# Patient Record
Sex: FEMALE | Race: BLACK OR AFRICAN AMERICAN | NOT HISPANIC OR LATINO | Employment: UNEMPLOYED | URBAN - METROPOLITAN AREA
[De-identification: names, ages, dates, MRNs, and addresses within clinical notes are randomized per-mention and may not be internally consistent; named-entity substitution may affect disease eponyms.]

---

## 2018-03-06 ENCOUNTER — INITIAL CONSULT (OUTPATIENT)
Dept: GYNECOLOGIC ONCOLOGY | Facility: CLINIC | Age: 53
End: 2018-03-06
Payer: MEDICAID

## 2018-03-06 VITALS
WEIGHT: 127.19 LBS | HEIGHT: 59 IN | DIASTOLIC BLOOD PRESSURE: 65 MMHG | SYSTOLIC BLOOD PRESSURE: 98 MMHG | HEART RATE: 64 BPM | BODY MASS INDEX: 25.64 KG/M2

## 2018-03-06 DIAGNOSIS — R19.00 PELVIC MASS: Primary | ICD-10-CM

## 2018-03-06 DIAGNOSIS — D49.59 OVARIAN NEOPLASM: ICD-10-CM

## 2018-03-06 PROCEDURE — 99205 OFFICE O/P NEW HI 60 MIN: CPT | Mod: S$PBB,,, | Performed by: OBSTETRICS & GYNECOLOGY

## 2018-03-06 PROCEDURE — 99213 OFFICE O/P EST LOW 20 MIN: CPT | Mod: PBBFAC | Performed by: OBSTETRICS & GYNECOLOGY

## 2018-03-06 PROCEDURE — 99999 PR PBB SHADOW E&M-EST. PATIENT-LVL III: CPT | Mod: PBBFAC,,, | Performed by: OBSTETRICS & GYNECOLOGY

## 2018-03-06 RX ORDER — IBUPROFEN 200 MG/1
TABLET ORAL
Refills: 0 | COMMUNITY
Start: 2018-02-22 | End: 2018-04-26 | Stop reason: CLARIF

## 2018-03-06 RX ORDER — TRAZODONE HYDROCHLORIDE 150 MG/1
150 TABLET ORAL NIGHTLY PRN
COMMUNITY
Start: 2018-02-22

## 2018-03-06 RX ORDER — LISINOPRIL 20 MG/1
20 TABLET ORAL EVERY MORNING
COMMUNITY
Start: 2018-02-07

## 2018-03-06 RX ORDER — POLYETHYLENE GLYCOL-3350 AND ELECTROLYTES 236; 6.74; 5.86; 2.97; 22.74 G/274.31G; G/274.31G; G/274.31G; G/274.31G; G/274.31G
POWDER, FOR SOLUTION ORAL
COMMUNITY
Start: 2018-02-22 | End: 2018-03-26 | Stop reason: ALTCHOICE

## 2018-03-06 NOTE — PROGRESS NOTES
Subjective:      Patient ID: Michelle Rivers is a 53 y.o. female.    Chief Complaint: Pelvic Mass (Consult )      HPI  Referred from Dr. Norma Saunders in [place] for pelvic mass. P1. Primary complaint is diarrhea. And weight loss.      Imaging reviewed.  CT 18 14.8cm predominately cystic pelvic mass with questionable solid component likely arising from the left ovary. No adenopathy or ascites.   US 18 16.2cm complex cystic pelvic mass, 6.3cm solid mass, unable to delineate uterus and ovaries     Labs reviewed:   normal per notes.   hgb 14.7  Cr 0.75    Medical history significant for HIV/Hep C, HTN, tobacco use.     Prior abdominal surgeries include c/s x 1.     Colonoscopy scheduled for 3/12/18.     MMG per patient normal a couple months ago.     Denies family history of breast, colon, uterine, or ovarian cancer.     Review of Systems   Constitutional: Negative for appetite change, chills, fatigue and fever.   HENT: Negative for mouth sores.    Respiratory: Negative for cough and shortness of breath.    Cardiovascular: Negative for leg swelling.   Gastrointestinal: Positive for diarrhea. Negative for abdominal pain, blood in stool and constipation.   Endocrine: Negative for cold intolerance.   Genitourinary: Negative for dysuria and vaginal bleeding.   Musculoskeletal: Negative for myalgias.   Skin: Negative for rash.   Allergic/Immunologic: Negative.    Neurological: Negative for weakness and numbness.   Hematological: Negative for adenopathy. Does not bruise/bleed easily.   Psychiatric/Behavioral: Negative for confusion.       History reviewed. No pertinent past medical history.  Past Surgical History:   Procedure Laterality Date    BREAST SURGERY       SECTION, CLASSIC       History reviewed. No pertinent family history.  Social History     Social History    Marital status: Single     Spouse name: N/A    Number of children: N/A    Years of education: N/A     Occupational History     Not on file.     Social History Main Topics    Smoking status: Current Every Day Smoker     Packs/day: 2.00    Smokeless tobacco: Not on file    Alcohol use Yes    Drug use: Yes      Comment: crack    Sexual activity: Not on file     Other Topics Concern    Not on file     Social History Narrative    No narrative on file     Current Outpatient Prescriptions   Medication Sig    lisinopril (PRINIVIL,ZESTRIL) 20 MG tablet     traZODone (DESYREL) 150 MG tablet Take 150 mg by mouth nightly as needed.     albuterol 90 mcg/actuation inhaler Inhale 2 puffs into the lungs every 4 (four) hours as needed for Wheezing.    azithromycin (ZITHROMAX Z-TARAS) 250 MG tablet Take 1 tablet (250 mg total) by mouth once daily. Take 2 tabs on day 1 then 1 tab kristen;y from day 2-5    GAVILYTE-G 236-22.74-6.74 -5.86 gram suspension     LAXATIVE, BISACODYL, 5 mg EC tablet take 4 tablets by mouth as directed     No current facility-administered medications for this visit.      Review of patient's allergies indicates:  No Known Allergies    Objective:   Physical Exam:   Constitutional: She is oriented to person, place, and time. She appears well-developed and well-nourished.    HENT:   Head: Normocephalic and atraumatic.    Eyes: EOM are normal. Pupils are equal, round, and reactive to light.    Neck: Normal range of motion. Neck supple. No thyromegaly present.    Cardiovascular: Normal rate, regular rhythm and intact distal pulses.     Pulmonary/Chest: Effort normal and breath sounds normal. No respiratory distress. She has no wheezes.        Abdominal: Soft. Bowel sounds are normal. She exhibits no distension, no ascites and no mass. There is no tenderness.     Genitourinary: Rectum normal, vagina normal and uterus normal. Pelvic exam was performed with patient supine. There is no lesion on the right labia. There is no lesion on the left labia. Cervix is normal. Right adnexum displays mass and fullness. Left adnexum displays  mass and fullness.   Genitourinary Comments: Large firm mass filling the posterior cul de sac, somewhat fixed, deviates the cervix anteriorly           Musculoskeletal: Normal range of motion and moves all extremeties.      Lymphadenopathy:     She has no cervical adenopathy.        Right: No inguinal and no supraclavicular adenopathy present.        Left: No inguinal and no supraclavicular adenopathy present.    Neurological: She is alert and oriented to person, place, and time.    Skin: Skin is warm and dry. No rash noted.    Psychiatric: She has a normal mood and affect.       Assessment:     1. Pelvic mass    2. Ovarian neoplasm        Plan:     Orders Placed This Encounter   Procedures    CT Abdomen Pelvis With Contrast    CEA      I discussed with the patient the differential diagnosis of pelvic mass in a postmenopausal woman including benign, borderline, and malignant process. The mass is firm and fixed in the cul de sac, likely why her primary symptom is diarrhea. She is scheduled for colonoscopy 3/12/18 and I would like her to have this prior to any surgical intervention to rule out colon primary. Will obtain CEA,  was normal. I would also like to repeat imaging to that I may review the images with radiology for surgical planning. She will return in 1 week for follow up and further treatment planning after the above have resulted.      CEA  CT abd and pelvis

## 2018-03-06 NOTE — Clinical Note
March 9, 2018      Olu Cunningham MD  [address] [address]           [facility] - Gynecologic Oncology  [address]  [facility] [address]  Phone: [phone]  Fax: [phone]          Patient: Michelle Rivers   MR Number: [id number]   YOB: 1965   Date of Visit: 3/6/2018       Dear Dr. Olu Cunningham:    Thank you for referring Michelle Rivers to me for evaluation. Attached you will find relevant portions of my assessment and plan of care.    If you have questions, please do not hesitate to call me. I look forward to following Michelle Rivers along with you.    Sincerely,    Gisselle Pedersen  CC:  No Recipients    If you would like to receive this communication electronically, please contact [email or web address] or [phone] to request more information on [employer] access.    For providers and/or their staff who would like to refer a patient to [facility], please contact us through our one-stop-shop provider referral line, Cedrick Walters, at [phone].    If you feel you have received this communication in error or would no longer like to receive these types of communications, please e-mail [email or web address]

## 2018-03-06 NOTE — LETTER
March 20, 2018        Norma Saunders MD  [address]  [address]  [place] [facility] [place] [address]             [facility]  [address]  [facility] [address]  Phone: [phone]  Fax: [phone]   Patient: Michelle Rivers   MR Number: [id number]   YOB: 1965   Date of Visit: 3/6/2018       Dear Dr. Saunders:    Thank you for referring Michelle Rivers to me for evaluation. Attached you will find relevant portions of my assessment and plan of care.    If you have questions, please do not hesitate to call me. I look forward to following Michelle Rivers along with you.    Sincerely,      Kaitlin Pinto MD            CC  No Recipients    Enclosure

## 2018-03-14 ENCOUNTER — TELEPHONE (OUTPATIENT)
Dept: GYNECOLOGIC ONCOLOGY | Facility: CLINIC | Age: 53
End: 2018-03-14

## 2018-03-16 ENCOUNTER — TELEPHONE (OUTPATIENT)
Dept: GYNECOLOGIC ONCOLOGY | Facility: CLINIC | Age: 53
End: 2018-03-16

## 2018-03-19 ENCOUNTER — TELEPHONE (OUTPATIENT)
Dept: GYNECOLOGIC ONCOLOGY | Facility: CLINIC | Age: 53
End: 2018-03-19

## 2018-03-19 ENCOUNTER — HOSPITAL ENCOUNTER (OUTPATIENT)
Dept: RADIOLOGY | Facility: OTHER | Age: 53
Discharge: HOME | End: 2018-03-19
Attending: OBSTETRICS & GYNECOLOGY
Payer: MEDICAID

## 2018-03-19 DIAGNOSIS — R19.00 PELVIC MASS: Primary | ICD-10-CM

## 2018-03-19 PROCEDURE — 74177 CT ABD & PELVIS W/CONTRAST: CPT | Mod: TC

## 2018-03-19 PROCEDURE — 25500020 PHARM REV CODE 255: Performed by: OBSTETRICS & GYNECOLOGY

## 2018-03-19 PROCEDURE — 74177 CT ABD & PELVIS W/CONTRAST: CPT | Mod: 26,,, | Performed by: RADIOLOGY

## 2018-03-19 RX ADMIN — IOHEXOL 75 ML: 350 INJECTION, SOLUTION INTRAVENOUS at 04:03

## 2018-03-19 RX ADMIN — IOHEXOL 25 ML: 350 INJECTION, SOLUTION INTRAVENOUS at 03:03

## 2018-03-20 ENCOUNTER — TELEPHONE (OUTPATIENT)
Dept: GYNECOLOGIC ONCOLOGY | Facility: CLINIC | Age: 53
End: 2018-03-20

## 2018-03-20 NOTE — TELEPHONE ENCOUNTER
----- Message from Margo Alvarez sent at 3/20/2018 10:34 AM CDT -----  Contact: pt's aunt [phone] [place]  _  1st Request  _  2nd Request  _  3rd Request        Who:   pt's aunt [phone] [place]  Why: Requesting a call back in regards to needs appt for ct results. Nothing available. Call pt's aunt  Please return the call at earliest convenience. Thanks!    What Number to Call Back:pt's aunt [phone] tor[place]      When to Expect a call back: (Within 24 hours)

## 2018-03-26 ENCOUNTER — TELEPHONE (OUTPATIENT)
Dept: GYNECOLOGIC ONCOLOGY | Facility: CLINIC | Age: 53
End: 2018-03-26

## 2018-03-26 ENCOUNTER — OFFICE VISIT (OUTPATIENT)
Dept: GYNECOLOGIC ONCOLOGY | Facility: CLINIC | Age: 53
End: 2018-03-26
Payer: MEDICAID

## 2018-03-26 VITALS
HEART RATE: 57 BPM | WEIGHT: 129.88 LBS | DIASTOLIC BLOOD PRESSURE: 87 MMHG | SYSTOLIC BLOOD PRESSURE: 132 MMHG | BODY MASS INDEX: 26.23 KG/M2

## 2018-03-26 DIAGNOSIS — R19.00 PELVIC MASS: Primary | ICD-10-CM

## 2018-03-26 PROCEDURE — 99999 PR PBB SHADOW E&M-EST. PATIENT-LVL III: CPT | Mod: PBBFAC,,, | Performed by: OBSTETRICS & GYNECOLOGY

## 2018-03-26 PROCEDURE — 99213 OFFICE O/P EST LOW 20 MIN: CPT | Mod: PBBFAC | Performed by: OBSTETRICS & GYNECOLOGY

## 2018-03-26 PROCEDURE — 99213 OFFICE O/P EST LOW 20 MIN: CPT | Mod: S$PBB,,, | Performed by: OBSTETRICS & GYNECOLOGY

## 2018-04-02 ENCOUNTER — HOSPITAL ENCOUNTER (EMERGENCY)
Facility: HOSPITAL | Age: 53
Discharge: HOME | End: 2018-04-02
Payer: MEDICAID

## 2018-04-02 ENCOUNTER — TELEPHONE (OUTPATIENT)
Dept: GYNECOLOGIC ONCOLOGY | Facility: CLINIC | Age: 53
End: 2018-04-02

## 2018-04-02 VITALS
DIASTOLIC BLOOD PRESSURE: 89 MMHG | HEART RATE: 80 BPM | WEIGHT: 122.25 LBS | OXYGEN SATURATION: 97 % | HEIGHT: 59 IN | RESPIRATION RATE: 16 BRPM | BODY MASS INDEX: 24.64 KG/M2 | SYSTOLIC BLOOD PRESSURE: 135 MMHG | TEMPERATURE: 99 F

## 2018-04-02 DIAGNOSIS — R31.9 HEMATURIA, UNSPECIFIED TYPE: Primary | ICD-10-CM

## 2018-04-02 DIAGNOSIS — N83.8 OVARIAN MASS, LEFT: ICD-10-CM

## 2018-04-02 LAB
ALBUMIN SERPL BCP-MCNC: 4.1 G/DL
ALP SERPL-CCNC: 75 U/L
ALT SERPL W/O P-5'-P-CCNC: 14 U/L
ANION GAP SERPL CALC-SCNC: 11 MMOL/L
AST SERPL-CCNC: 28 U/L
BACTERIA #/AREA URNS HPF: ABNORMAL /HPF
BASOPHILS # BLD AUTO: 0.02 K/UL
BASOPHILS NFR BLD: 0.5 %
BILIRUB SERPL-MCNC: 0.6 MG/DL
BILIRUB UR QL STRIP: NEGATIVE
BUN SERPL-MCNC: 6 MG/DL
CALCIUM SERPL-MCNC: 10.2 MG/DL
CHLORIDE SERPL-SCNC: 105 MMOL/L
CLARITY UR: CLEAR
CO2 SERPL-SCNC: 20 MMOL/L
COLOR UR: YELLOW
CREAT SERPL-MCNC: 0.8 MG/DL
DIFFERENTIAL METHOD: ABNORMAL
EOSINOPHIL # BLD AUTO: 0.1 K/UL
EOSINOPHIL NFR BLD: 1.9 %
ERYTHROCYTE [DISTWIDTH] IN BLOOD BY AUTOMATED COUNT: 14.6 %
EST. GFR  (AFRICAN AMERICAN): >60 ML/MIN/1.73 M^2
EST. GFR  (NON AFRICAN AMERICAN): >60 ML/MIN/1.73 M^2
GLUCOSE SERPL-MCNC: 87 MG/DL
GLUCOSE UR QL STRIP: NEGATIVE
HCT VFR BLD AUTO: 48.2 %
HGB BLD-MCNC: 16.8 G/DL
HGB UR QL STRIP: ABNORMAL
KETONES UR QL STRIP: NEGATIVE
LEUKOCYTE ESTERASE UR QL STRIP: NEGATIVE
LYMPHOCYTES # BLD AUTO: 2 K/UL
LYMPHOCYTES NFR BLD: 48.2 %
MCH RBC QN AUTO: 31.3 PG
MCHC RBC AUTO-ENTMCNC: 34.9 G/DL
MCV RBC AUTO: 90 FL
MICROSCOPIC COMMENT: ABNORMAL
MONOCYTES # BLD AUTO: 0.3 K/UL
MONOCYTES NFR BLD: 7.3 %
NEUTROPHILS # BLD AUTO: 1.8 K/UL
NEUTROPHILS NFR BLD: 42.1 %
NITRITE UR QL STRIP: NEGATIVE
PH UR STRIP: 5 [PH] (ref 5–8)
PLATELET # BLD AUTO: 207 K/UL
PMV BLD AUTO: 10 FL
POTASSIUM SERPL-SCNC: 4.8 MMOL/L
PROT SERPL-MCNC: 9.6 G/DL
PROT UR QL STRIP: NEGATIVE
RBC # BLD AUTO: 5.36 M/UL
RBC #/AREA URNS HPF: >100 /HPF (ref 0–4)
SODIUM SERPL-SCNC: 136 MMOL/L
SP GR UR STRIP: 1.02 (ref 1–1.03)
SQUAMOUS #/AREA URNS HPF: 40 /HPF
URN SPEC COLLECT METH UR: ABNORMAL
UROBILINOGEN UR STRIP-ACNC: NEGATIVE EU/DL
WBC # BLD AUTO: 4.23 K/UL

## 2018-04-02 PROCEDURE — 99284 EMERGENCY DEPT VISIT MOD MDM: CPT

## 2018-04-02 PROCEDURE — 87086 URINE CULTURE/COLONY COUNT: CPT

## 2018-04-02 PROCEDURE — 80053 COMPREHEN METABOLIC PANEL: CPT

## 2018-04-02 PROCEDURE — 81000 URINALYSIS NONAUTO W/SCOPE: CPT

## 2018-04-02 PROCEDURE — 25500020 PHARM REV CODE 255: Performed by: REGISTERED NURSE

## 2018-04-02 PROCEDURE — 85025 COMPLETE CBC W/AUTO DIFF WBC: CPT

## 2018-04-02 RX ADMIN — IOHEXOL 75 ML: 350 INJECTION, SOLUTION INTRAVENOUS at 03:04

## 2018-04-02 NOTE — TELEPHONE ENCOUNTER
"Spoke with pt aunt Sheryl. Per Sheryl she "states the pt is passing blood, urinary pressure and condition is getting worse". She states she is on her way to do ED to have the pt further evaluated. Sheryl advised Dr. Pinto will be updated. She voiced understanding   "

## 2018-04-02 NOTE — ED PROVIDER NOTES
History      Chief Complaint   Patient presents with    Hematuria     pt c/o blood in urine and difficulty passing urine, pt reports pmhx bladder mass       Review of patient's allergies indicates:  No Known Allergies     HPI   HPI    2018, 12:13 PM   History obtained from the patient      History of Present Illness: Michelle Rivers is a 53 y.o. female patient (Pmhx- ovarian mass) who presents to the Emergency Department for hematuria and urinary hesitancy which onset gradually 2 days ago. Pt has surgery scheduled to have mass removed on 5/3/18 by Dr. Pinto (oncologist at [facility]). Symptoms are constant and moderate in severity. No mitigating or exacerbating factors reported. Associated sxs include pain and diarrhea. Patient denies any fever, CP, SOB, and all other sxs at this time. Prior Tx includes nothing. No further complaints or concerns at this time.          Arrival mode: Personal vehicle      PCP: Serenity Aaron MD       Past Medical History:  No past medical history on file.    Past Surgical History:  Past Surgical History:   Procedure Laterality Date    BREAST SURGERY       SECTION, CLASSIC           Family History:  No family history on file.    Social History:  Social History     Social History Main Topics    Smoking status: Current Every Day Smoker     Packs/day: 2.00    Smokeless tobacco: Never Used    Alcohol use Yes    Drug use: Yes      Comment: crack    Sexual activity: Not on file       ROS   Review of Systems   Constitutional: Negative for fever.   HENT: Negative for sore throat.    Respiratory: Negative for shortness of breath.    Cardiovascular: Negative for chest pain.   Gastrointestinal: Positive for abdominal pain and diarrhea. Negative for nausea.   Genitourinary: Positive for decreased urine volume and hematuria. Negative for dysuria.   Musculoskeletal: Negative for back pain.   Skin: Negative for rash.   Neurological: Negative for weakness.  "  Hematological: Does not bruise/bleed easily.   All other systems reviewed and are negative.      Physical Exam      Initial Vitals [04/02/18 1156]   BP Pulse Resp Temp SpO2   (!) 135/97 85 18 98.6 °F (37 °C) 98 %      MAP       109.67          Physical Exam  Nursing Notes and Vital Signs Reviewed.  Constitutional: Patient is in no acute distress. Well-developed and well-nourished.  Head: Atraumatic. Normocephalic.  Eyes: PERRL. EOM intact. Conjunctivae are not pale. No scleral icterus.  ENT: Mucous membranes are moist. Oropharynx is clear and symmetric.    Neck: Supple. Full ROM. No lymphadenopathy.  Cardiovascular: Regular rate. Regular rhythm. No murmurs, rubs, or gallops. Distal pulses are 2+ and symmetric.  Pulmonary/Chest: No respiratory distress. Clear to auscultation bilaterally. No wheezing or rales.  Abdominal: Soft and non-distended.  There is mild tenderness to LLQ.  No rebound, guarding, or rigidity. Good bowel sounds.  Genitourinary: No CVA tenderness  Musculoskeletal: Moves all extremities. No obvious deformities. No edema. No calf tenderness.  Skin: Warm and dry.  Neurological:  Alert, awake, and appropriate.  Normal speech.  No acute focal neurological deficits are appreciated.  Psychiatric: Normal affect. Good eye contact. Appropriate in content.    ED Course    Procedures  ED Vital Signs:  Vitals:    04/02/18 1156 04/02/18 1354 04/02/18 1544   BP: (!) 135/97 114/85 135/89   Pulse: 85 72 80   Resp: 18 18 16   Temp: 98.6 °F (37 °C)  98.5 °F (36.9 °C)   TempSrc: Oral  Oral   SpO2: 98% 98% 97%   Weight: 55.4 kg (122 lb 3.9 oz)     Height: 4' 11" (1.499 m)         Abnormal Lab Results:  Labs Reviewed   CBC W/ AUTO DIFFERENTIAL - Abnormal; Notable for the following:        Result Value    Hemoglobin 16.8 (*)     MCH 31.3 (*)     RDW 14.6 (*)     Lymph% 48.2 (*)     All other components within normal limits   COMPREHENSIVE METABOLIC PANEL - Abnormal; Notable for the following:     CO2 20 (*)     Total " Protein 9.6 (*)     All other components within normal limits   URINALYSIS - Abnormal; Notable for the following:     Occult Blood UA 3+ (*)     All other components within normal limits   URINALYSIS MICROSCOPIC - Abnormal; Notable for the following:     RBC, UA >100 (*)     Bacteria, UA Moderate (*)     All other components within normal limits   CULTURE, URINE   CULTURE, URINE        All Lab Results:  Results for orders placed or performed during the hospital encounter of 04/02/18   CBC auto differential   Result Value Ref Range    WBC 4.23 3.90 - 12.70 K/uL    RBC 5.36 4.00 - 5.40 M/uL    Hemoglobin 16.8 (H) 12.0 - 16.0 g/dL    Hematocrit 48.2 37.0 - 48.5 %    MCV 90 82 - 98 fL    MCH 31.3 (H) 27.0 - 31.0 pg    MCHC 34.9 32.0 - 36.0 g/dL    RDW 14.6 (H) 11.5 - 14.5 %    Platelets 207 150 - 350 K/uL    MPV 10.0 9.2 - 12.9 fL    Gran # (ANC) 1.8 1.8 - 7.7 K/uL    Lymph # 2.0 1.0 - 4.8 K/uL    Mono # 0.3 0.3 - 1.0 K/uL    Eos # 0.1 0.0 - 0.5 K/uL    Baso # 0.02 0.00 - 0.20 K/uL    Gran% 42.1 38.0 - 73.0 %    Lymph% 48.2 (H) 18.0 - 48.0 %    Mono% 7.3 4.0 - 15.0 %    Eosinophil% 1.9 0.0 - 8.0 %    Basophil% 0.5 0.0 - 1.9 %    Differential Method Automated    Comprehensive metabolic panel   Result Value Ref Range    Sodium 136 136 - 145 mmol/L    Potassium 4.8 3.5 - 5.1 mmol/L    Chloride 105 95 - 110 mmol/L    CO2 20 (L) 23 - 29 mmol/L    Glucose 87 70 - 110 mg/dL    BUN, Bld 6 6 - 20 mg/dL    Creatinine 0.8 0.5 - 1.4 mg/dL    Calcium 10.2 8.7 - 10.5 mg/dL    Total Protein 9.6 (H) 6.0 - 8.4 g/dL    Albumin 4.1 3.5 - 5.2 g/dL    Total Bilirubin 0.6 0.1 - 1.0 mg/dL    Alkaline Phosphatase 75 55 - 135 U/L    AST 28 10 - 40 U/L    ALT 14 10 - 44 U/L    Anion Gap 11 8 - 16 mmol/L    eGFR if African American >60 >60 mL/min/1.73 m^2    eGFR if non African American >60 >60 mL/min/1.73 m^2   Urinalysis Clean Catch   Result Value Ref Range    Specimen UA Urine, Clean Catch     Color, UA Yellow Yellow, Straw, Rachel     Appearance, UA Clear Clear    pH, UA 5.0 5.0 - 8.0    Specific Gravity, UA 1.025 1.005 - 1.030    Protein, UA Negative Negative    Glucose, UA Negative Negative    Ketones, UA Negative Negative    Bilirubin (UA) Negative Negative    Occult Blood UA 3+ (A) Negative    Nitrite, UA Negative Negative    Urobilinogen, UA Negative <2.0 EU/dL    Leukocytes, UA Negative Negative   Urinalysis Microscopic   Result Value Ref Range    RBC, UA >100 (H) 0 - 4 /hpf    Bacteria, UA Moderate (A) None-Occ /hpf    Squam Epithel, UA 40 /hpf    Microscopic Comment SEE COMMENT          Imaging Results:  Imaging Results          CT Abdomen Pelvis With Contrast (Final result)  Result time 04/02/18 15:18:44    Final result by Lonnie Mitchell III, MD (04/02/18 15:18:44)                 Impression:             1.  Large complex pelvic mass again noted similar in overall size and appearance to March.  There is less mass effect on the ureters with no significant residual hydronephrosis.    2.  No other significant findings.  No detrimental change.      All CT scans at this facility use dose modulation, iterative reconstruction, and/or weight based dosing when appropriate to reduce radiation dose to as low as reasonably achievable.        Electronically signed by: LONNIE MITCHELL MD  Date:     04/02/18  Time:    15:18              Narrative:    Exam: CT scan of the abdomen and pelvis with contrast    Technique: Axial CT imaging was performed through the abdomen and pelvis with  75cc  of intravenous contrast. Multiplanar reformats were performed and interpreted.    Clinical History: Neoplasm: ovarian, suspected .   Abdominal pain     Comparison: March    Findings:         The heart size is normal.  Lung bases show no acute abnormality.  No free intraperitoneal air or bowel obstruction.  Bony windows show no acute abnormality.  Degenerative changes are noted in the spine.    Within the abdomen the liver and spleen remain unremarkable.   Mild prominence the common duct again noted.  Gallbladder is grossly unremarkable by CT appearance.  No adrenal mass or enlargement and no gross pancreatic abnormality is seen.  No bowel obstruction.  No ascites within the abdomen.  No abnormality in the right lower quadrant to suggest acute appendicitis.  No evidence of abdominal aortic aneurysm or dissection.    The kidneys show less distention of the collecting systems when compared to the prior study and are now within normal limits.  Minimal residual prominence on the left.  Small cyst again noted.      Complex multicystic pelvic mass again noted similar in overall appearance to the prior study.  This currently measures approximately 17 x 9.7 cm in diameter, grossly unchanged.  This has local mass effect throughout the pelvis.                                      The Emergency Provider reviewed the vital signs and test results, which are outlined above.    ED Discussion     3:36 PM: Reassessed pt at this time.  Pt states her condition has improved at this time. Discussed with pt all pertinent ED information and results. Discussed pt dx and plan of tx. Gave pt all f/u and return to the ED instructions. All questions and concerns were addressed at this time. Pt expresses understanding of information and instructions, and is comfortable with plan to discharge. Pt is stable for discharge.        ED Medication(s):  Medications   omnipaque 350 iohexol 75 mL (75 mLs Intravenous Given 4/2/18 1500)       Discharge Medication List as of 4/2/2018  3:36 PM          Follow-up Information     Primary Doctor No In 3 days.                   Medical Decision Making                   Clinical Impression       ICD-10-CM ICD-9-CM   1. Hematuria, unspecified type R31.9 599.70   2. Ovarian mass, left N83.9 620.9               Fredy Mariee Jr., P  04/02/18 2036

## 2018-04-02 NOTE — TELEPHONE ENCOUNTER
"----- Message from Gloria Barnett sent at 4/2/2018 10:06 AM CDT -----  Contact: Sheryl pt's aunt  _  1st Request  _  2nd Request  _x  3rd Request        Who: Sheryl pt's aunt    Why: She states the patient has been "passing blood" and has urinary pressure since yesterday. She states she is bringing the patient to [facility] on [address] in  and would like a call back to discuss the vist.     What Number to Call Back: [phone]    When to Expect a call back: (Before the end of the day)   -- if call after 3:00 call back will be tomorrow.    "

## 2018-04-04 LAB
BACTERIA UR CULT: NORMAL
BACTERIA UR CULT: NORMAL

## 2018-04-08 RX ORDER — SODIUM CHLORIDE 9 MG/ML
INJECTION, SOLUTION INTRAVENOUS CONTINUOUS
Status: CANCELLED | OUTPATIENT
Start: 2018-04-08

## 2018-04-08 RX ORDER — LIDOCAINE HYDROCHLORIDE 10 MG/ML
1 INJECTION, SOLUTION EPIDURAL; INFILTRATION; INTRACAUDAL; PERINEURAL ONCE
Status: CANCELLED | OUTPATIENT
Start: 2018-04-08 | End: 2018-04-08

## 2018-04-08 NOTE — PROGRESS NOTES
Subjective:      Patient ID: Michelle Rivers is a 53 y.o. female.    Chief Complaint: Follow-up (review ct scans/sign consents)      HPI  Referred from Dr. Norma Saunders in [place] for pelvic mass. P1. Primary complaint is diarrhea. And weight loss.       Imaging reviewed.  CT 2/27/18 14.8cm predominately cystic pelvic mass with questionable solid component likely arising from the left ovary. No adenopathy or ascites.   US 2/28/18 16.2cm complex cystic pelvic mass, 6.3cm solid mass, unable to delineate uterus and ovaries      Labs reviewed:   normal per notes.   hgb 14.7  Cr 0.75     Medical history significant for HIV/Hep C, HTN, tobacco use.      Prior abdominal surgeries include c/s x 1.      Colonoscopy scheduled for 3/12/18.      MMG per patient normal a couple months ago.      Denies family history of breast, colon, uterine, or ovarian cancer.    Presents today for follow up. Colonoscopy 3/2018 normal per patient. CEA 5.5   CT 3/19/18  Impression   Complex heterogeneous pelvic mass consistent with history of ovarian neoplasm.  Mild distention of the bilateral renal collecting systems likely due to mass effect from a pelvic mass.     Review of Systems   Constitutional: Negative for appetite change, chills, fatigue and fever.   HENT: Negative for mouth sores.    Respiratory: Negative for cough and shortness of breath.    Cardiovascular: Negative for leg swelling.   Gastrointestinal: Negative for abdominal pain, blood in stool, constipation and diarrhea.   Endocrine: Negative for cold intolerance.   Genitourinary: Negative for dysuria and vaginal bleeding.   Musculoskeletal: Negative for myalgias.   Skin: Negative for rash.   Allergic/Immunologic: Negative.    Neurological: Negative for weakness and numbness.   Hematological: Negative for adenopathy. Does not bruise/bleed easily.   Psychiatric/Behavioral: Negative for confusion.       Objective:   Physical Exam:   Constitutional: She is oriented to  person, place, and time. She appears well-developed and well-nourished.    HENT:   Head: Normocephalic and atraumatic.    Eyes: EOM are normal. Pupils are equal, round, and reactive to light.    Neck: Normal range of motion. Neck supple. No thyromegaly present.    Cardiovascular: Normal rate, regular rhythm and intact distal pulses.     Pulmonary/Chest: Effort normal and breath sounds normal. No respiratory distress. She has no wheezes.        Abdominal: Soft. Bowel sounds are normal. She exhibits no distension, no ascites and no mass. There is no tenderness.             Musculoskeletal: Normal range of motion and moves all extremeties.      Lymphadenopathy:     She has no cervical adenopathy.        Right: No supraclavicular adenopathy present.        Left: No supraclavicular adenopathy present.    Neurological: She is alert and oriented to person, place, and time.    Skin: Skin is warm and dry. No rash noted.    Psychiatric: She has a normal mood and affect.       Assessment:     1. Pelvic mass        Plan:   No orders of the defined types were placed in this encounter.    I discussed with the patient the differential diagnosis for pelvic mass in a postmenopausal woman including benign, borderline, and malignant process. Colonoscopy normal. I have recommended surgical resection. Will plan for ROMAIN/BSO/possible staging absed on intraoperative findings. She desires to proceed. The risks, benefits, and indications of the procedure were discussed with the patient and her family members if present.  These included bleeding, transfusion, infection, damage to surrounding tissues (bowel, bladder, ureter), wound separation, perioperative cardiac events, VTE, pneumonia, and possible death.  She voiced understanding, all questions were answered and consents were signed.  1. Plan for ROMAIN/BSO/possible staging 5/3/18 [facility]  2. Pre op anesthesia consult

## 2018-04-19 ENCOUNTER — TELEPHONE (OUTPATIENT)
Dept: GYNECOLOGIC ONCOLOGY | Facility: CLINIC | Age: 53
End: 2018-04-19

## 2018-04-19 NOTE — TELEPHONE ENCOUNTER
Called pts aunt Mrs Joiner in reference to her message about making a reservation to the [facility] on 5/2/18. Informed pts aunt that the stay would not be free. She was given the phone number to the [facility]. She said thanks.  MA/LPN

## 2018-04-20 DIAGNOSIS — Z01.818 PREOP TESTING: Primary | ICD-10-CM

## 2018-04-23 ENCOUNTER — ANESTHESIA EVENT (OUTPATIENT)
Dept: SURGERY | Facility: HOSPITAL | Age: 53
DRG: 743 | End: 2018-04-23
Payer: MEDICAID

## 2018-04-23 NOTE — ANESTHESIA PREPROCEDURE EVALUATION
Anesthesia Assessment: Preoperative EQUATION     Planned Procedure: Procedure(s) (LRB):  HYSTERECTOMY-ABDOMINAL-TOTAL (ROMAIN) (N/A)  LREXIRYO-BDCRICCNEBQE-SEXEPSXJC (BSO) (Bilateral)  STAGING (N/A)  Requested Anesthesia Type:General  Surgeon: Kaitlin Pinto MD  Service: OB/GYN  Known or anticipated Date of Surgery:5/3/2018     Optimization:  Anesthesia Preop Clinic Assessment  Indicated-requested by surgeon                Plan:    Testing:  T&S   Pre-anesthesia  visit                                        Visit focus: concerns in complex and/or prolonged anesthesia, requested by surgeon                            Patient  has previously scheduled Medical Appointment: none     Navigation: Tests Scheduled.                        Results will be tracked by Preop Clinic.     Stephanie Barcenas RN                          Electronically signed by Stephanie Barcenas RN at 2018  9:47 AM                                                                                                                   2018  Michelle Rivers is a 53 y.o., female.  Pre-operative evaluation for HYSTERECTOMY-ABDOMINAL-TOTAL (ROMAIN) (N/A), CHEKZVCV-ZSLNABHPFVUP-OZOATKKZU (BSO) (Bilateral), STAGING (N/A)    Chief Complaint: diarrhea, weight loss, pelvic mass    PMH:  HTN on ACEI  Smoker   ETOH - beer  HIV, no antiviral- viral load unknown-used IV drugs in distant past  Hep C-not treated  depression  Past Surgical History:   Procedure Laterality Date    BREAST SURGERY      Left side -      SECTION, CLASSIC      once    FRACTURE SURGERY Left     left arm surgery         Vital Signs Range (Last 24H):  Temp:  [36.6 °C (97.8 °F)]   Pulse:  [55]   Resp:  [18]   BP: (101)/(68)   SpO2:  [95 %]       CBC:     Recent Labs  Lab 18  1308   WBC 4.36   RBC 4.99   HGB 15.2   HCT 47.7      MCV 96   MCH 30.5   MCHC 31.9*       CMP: No results for input(s): NA, K, CL, CO2, BUN, CREATININE, GLU, MG, PHOS, CALCIUM,  ALBUMIN, PROT, ALKPHOS, ALT, AST, BILITOT in the last 720 hours.    INR:    Recent Labs  Lab 18  1308   INR 1.0         Diagnostic Studies:      EKD Echo:  Anesthesia Evaluation    I have reviewed the Patient Summary Reports.     I have reviewed the Nursing Notes.   I have reviewed the Medications.     Review of Systems  Anesthesia Hx:  No problems with previous Anesthesia History of prior surgery of interest to airway management or planning: Previous anesthesia: MAC, General LUE ORIF  with general anesthesia.   colonoscopy, egd 3/2018 with MAC.  Denies Family Hx of Anesthesia complications.    Social:  Smoker 1 ppd x 40 yrs;  Drinks beer approx 2 quarts every other day  Off drugs x 4 yrs   Hematology/Oncology:     Oncology Normal   Hematology Comments: HIV (takes no antiviral meds); dx 20 yrs ago   EENT/Dental:   [place]  Glasses  EGD report- 3/12/2018 - Acanthosis nigricans involving the esophagus , posterior pharynx and vocal cords. Pathology from EGD from 3/12/2018-no mention of Acanthosis Nigricans.       Cardiovascular:  Cardiovascular Normal Hypertension  Denies MI.    Functional Capacity good / => 4 METS, walks to store near home several times daily, climb 1 FOS, denies SOB, CP    Pulmonary:  Pulmonary Normal States had bout of bronchitis and was started on Albuterol inhaler which she stopped using because it caused headaches; denies wheezing, SOB   Renal/:  Renal/ Normal  hematuria   Hepatic/GI:   GERD (prn Nexium but currently not needed) Hepatitis, C    Musculoskeletal:   Limited movement of LUE due to pain and previous fracture with ORIF repair   OB/GYN/PEDS:  Pelvic mass   Neurological:  Neurology Normal  Pain , onset is acute , location of lower abdomen/pelvic area , quality of pressing, sharp , severity is a 6    Endocrine:   Denies Diabetes.    Psych:   depression          Physical Exam  General:  Well nourished    Airway/Jaw/Neck:  Airway Findings: Mouth Opening: Normal Tongue:  Normal  General Airway Assessment: Adult  Mallampati: II  Improves to I with phonation.  Jaw/Neck Findings:     Neck ROM: Normal ROM      Dental:  Dental Findings: Periodontal disease, Severe, loose tooth    Chest/Lungs:  Chest/Lungs Findings: Normal Respiratory Rate, Rhonchi     Heart/Vascular:  Heart Findings: Rate: Normal  Rhythm: Regular Rhythm  Sounds: Normal        Mental Status:  Mental Status Findings:  Cooperative, Alert and Oriented       Pt was seen in POC 4/26/18; findings discussed with Dr Leopold-review of outside record (EGD report) recommended ENT, pulmonary eval which was not done; pt scheduled to see Dr Valdez today. Medical optimization: please see EPIC notes for recommendations of pre-op medical consultant, Dr Valdez, for perioperative medical management. /Bri Chang RN        Anesthesia Plan  Type of Anesthesia, risks & benefits discussed:  Anesthesia Type:  general  Patient's Preference:   Intra-op Monitoring Plan: arterial line  Intra-op Monitoring Plan Comments:   Post Op Pain Control Plan: multimodal analgesia  Post Op Pain Control Plan Comments:   Induction:   IV  Beta Blocker:  Patient is not currently on a Beta-Blocker (No further documentation required).       Informed Consent:    ASA Score: 3     Day of Surgery Review of History & Physical:            Ready For Surgery From Anesthesia Perspective.

## 2018-04-23 NOTE — PRE ADMISSION SCREENING
Anesthesia Assessment: Preoperative EQUATION    Planned Procedure: Procedure(s) (LRB):  HYSTERECTOMY-ABDOMINAL-TOTAL (ROMAIN) (N/A)  TUKXCHXI-LYJOLTBIINYY-QROIXTBUZ (BSO) (Bilateral)  STAGING (N/A)  Requested Anesthesia Type:General  Surgeon: Kaitlin Pinto MD  Service: OB/GYN  Known or anticipated Date of Surgery:5/3/2018    Optimization:  Anesthesia Preop Clinic Assessment  Indicated-requested by surgeon      Plan:    Testing:  T&S   Pre-anesthesia  visit  4/26     Visit focus: concerns in complex and/or prolonged anesthesia, requested by surgeon      Patient  has previously scheduled Medical Appointment: none    Navigation: Tests Scheduled. 4/26            Results will be tracked by Preop Clinic.    Stephanie Barcenas RN

## 2018-04-26 ENCOUNTER — INITIAL CONSULT (OUTPATIENT)
Dept: INTERNAL MEDICINE | Facility: CLINIC | Age: 53
End: 2018-04-26
Payer: MEDICAID

## 2018-04-26 ENCOUNTER — TELEPHONE (OUTPATIENT)
Dept: GYNECOLOGIC ONCOLOGY | Facility: CLINIC | Age: 53
End: 2018-04-26

## 2018-04-26 ENCOUNTER — HOSPITAL ENCOUNTER (OUTPATIENT)
Dept: CARDIOLOGY | Facility: CLINIC | Age: 53
Discharge: HOME | End: 2018-04-26
Payer: MEDICAID

## 2018-04-26 ENCOUNTER — HOSPITAL ENCOUNTER (OUTPATIENT)
Dept: PREADMISSION TESTING | Facility: HOSPITAL | Age: 53
Discharge: HOME | End: 2018-04-26
Attending: ANESTHESIOLOGY
Payer: MEDICAID

## 2018-04-26 VITALS
RESPIRATION RATE: 18 BRPM | WEIGHT: 126.69 LBS | HEIGHT: 59 IN | BODY MASS INDEX: 25.54 KG/M2 | SYSTOLIC BLOOD PRESSURE: 124 MMHG | OXYGEN SATURATION: 99 % | HEART RATE: 68 BPM | TEMPERATURE: 98 F | DIASTOLIC BLOOD PRESSURE: 80 MMHG

## 2018-04-26 DIAGNOSIS — D58.2 ELEVATED HEMOGLOBIN: ICD-10-CM

## 2018-04-26 DIAGNOSIS — F32.A DEPRESSION, UNSPECIFIED DEPRESSION TYPE: ICD-10-CM

## 2018-04-26 DIAGNOSIS — R79.89 ABNORMAL CBC: ICD-10-CM

## 2018-04-26 DIAGNOSIS — Z72.0 TOBACCO ABUSE: ICD-10-CM

## 2018-04-26 DIAGNOSIS — B19.20 HEPATITIS C VIRUS INFECTION WITHOUT HEPATIC COMA, UNSPECIFIED CHRONICITY: ICD-10-CM

## 2018-04-26 DIAGNOSIS — Z01.818 PREOP TESTING: ICD-10-CM

## 2018-04-26 DIAGNOSIS — B20 HIV (HUMAN IMMUNODEFICIENCY VIRUS INFECTION): ICD-10-CM

## 2018-04-26 DIAGNOSIS — R39.9 LOWER URINARY TRACT SYMPTOMS (LUTS): ICD-10-CM

## 2018-04-26 DIAGNOSIS — R19.00 PELVIC MASS: ICD-10-CM

## 2018-04-26 DIAGNOSIS — L83 ACANTHOSIS NIGRICANS: ICD-10-CM

## 2018-04-26 DIAGNOSIS — I10 ESSENTIAL HYPERTENSION: ICD-10-CM

## 2018-04-26 DIAGNOSIS — K21.9 GASTROESOPHAGEAL REFLUX DISEASE, ESOPHAGITIS PRESENCE NOT SPECIFIED: ICD-10-CM

## 2018-04-26 DIAGNOSIS — Z01.818 PREOP TESTING: Primary | ICD-10-CM

## 2018-04-26 DIAGNOSIS — R77.8 ELEVATED TOTAL PROTEIN: ICD-10-CM

## 2018-04-26 PROCEDURE — 99212 OFFICE O/P EST SF 10 MIN: CPT | Mod: PBBFAC,25 | Performed by: HOSPITALIST

## 2018-04-26 PROCEDURE — 99999 PR PBB SHADOW E&M-EST. PATIENT-LVL II: CPT | Mod: PBBFAC,,, | Performed by: HOSPITALIST

## 2018-04-26 PROCEDURE — 99204 OFFICE O/P NEW MOD 45 MIN: CPT | Mod: S$PBB,,, | Performed by: HOSPITALIST

## 2018-04-26 NOTE — LETTER
April 26, 2018      Kaitlin Pinto MD  [address] [facility] [id number]           [facility] - Pre Op Consult  [address] [facility] [address]  Phone: [phone]          Patient: Michelle Rivers   MR Number: [id number]   YOB: 1965   Date of Visit: 4/26/2018       Dear Dr. Kaitlin Pinto:    Thank you for referring Michelle Rivers to me for evaluation. Attached you will find relevant portions of my assessment and plan of care.    If you have questions, please do not hesitate to call me. I look forward to following Michelle Rivers along with you.    Sincerely,    Heather Valdez MD    Enclosure  CC:  No Recipients    If you would like to receive this communication electronically, please contact [email or web address] or [phone] to request more information on [employer] access.    For providers and/or their staff who would like to refer a patient to [facility], please contact us through our one-stop-shop provider referral line, [facility], at [phone].    If you feel you have received this communication in error or would no longer like to receive these types of communications, please e-mail [email or web address]

## 2018-04-26 NOTE — HPI
History of present illness- I had the pleasure of meeting this pleasant 53 y.o. lady in the pre op clinic prior to her elective Gynecological surgery. The patient is new to me ..    I have obtained the history by speaking to the patient and by reviewing the electronic health records.    Events leading up to surgery / History of presenting illness -    Pelvic mass    She has been troubled with moderate-severe  sharp lower abdominal   Pain on and off  for about 2 months  Pain some times comes randomly and some times , when she holds the urine      Relevant health conditions of significance for the perioperative period/ History of presenting illness -    Patient Active Problem List    Diagnosis Date Noted    Essential hypertension 04/26/2018     Lisinopril   For about a few months  No Home  Machine  Did not take BP medication this AM  To her understanding BP is controlled with Primary care MD      Tobacco abuse 04/26/2018     Tobacco use- Smokes Cigarettes- 1 PPD-since age 13 years   Not known to have COPD , no chronic Phlegm, no wheezing   She used to have Asthma/ Bronchitis many years ago and had a bad cold and came to ER ,about 3 years ago , did not require hospitalization   Had an Injection ( ? Steroid) and was send home on Cough Medicine and inhaler   Tried Inhaler , but she is no longer using it as she had head ache with inhaler   No longer experiencing lung problems since them      HIV (human immunodeficiency virus infection) 04/26/2018     History of Intra venous drug abuse about 20 years ago   Stopped IVDU 20 years ago  Smoked crack cocaine until 4 years ago   Diagnosed about 20 years ago   Follows with infectious disease at [facility] , Last seen about 3 months ago   Not on HIV treatment   Never was on treatment   To her understanding she did not need HIV treatment   No history of Tuberculosis , Thrush or Proneness to Infection   Un aware of CD4 count         Hepatitis C 04/26/2018     Diagnosed many  years ago   Following ID  As per patient , plan is for commencement of treatment after Gynecological surgery   Not known to have Cirrhosis of liver ?  Not known to have liver failure       Acid reflux 04/26/2018     With certain foods   Controlled  Not needing Medication       Depression 04/26/2018     Trazodone helping sleeping  Under Primary care   Plans on following with  , Psychiatrist after Gynecological surgery       Lower urinary tract symptoms (LUTS) 04/26/2018     Like;y from pelvic mass      Elevated hemoglobin- 4/2/2018 04/26/2018     In the setting of diarrhea   Likely from Hemoconcentration       Elevated total protein 04/26/2018     Likely from Infective illness   Not known to have Myeloma       Acanthosis nigricans 04/26/2018     EGD- 3/12/2018 - Acanthosis nigricans involving the esophagus , posterior pharynx and vocal cords       Pelvic mass 03/06/2018     Started with diarrhea, 3-4 months ago , had it for 2 months   Watery diarrhea , lost about 30 pounds since the beginning of the year   Had it bad and had to use Diaper at night time   On coughing used to have diarrhea   Had diarrhea evaluated and was found to have pelvic mass   Also had mild vaginal bleeding ( noticed on wiping ) on and off for 1-2 months  No longer troubled with diarrhea, appetite and weight picking   Had problems urinating , Hesitancy and feeling on incomplete bladder emptying   Primary complaint is diarrhea. And weight loss.       Imaging  CT 2/27/18 14.8cm predominately cystic pelvic mass with questionable solid component likely arising from the left ovary. No adenopathy or ascites.   US 2/28/18 16.2cm complex cystic pelvic mass, 6.3cm solid mass, unable to delineate uterus and ovaries

## 2018-04-26 NOTE — ASSESSMENT & PLAN NOTE
Prefer that her CD4 count be over 200 for surgery , but would not delay the surgery for CD4 count , given the pelvic mass   Suggested follow up    4/27/2018- chart  Update-  ID records dated 12/21/2017 - CD4 832

## 2018-04-26 NOTE — ASSESSMENT & PLAN NOTE
CT from April 2018 showed   liver and spleen  unremarkable.    No ascites within the abdomen.  No suggestion of liver / hepatic decompensation or portal Hypertension

## 2018-04-26 NOTE — ASSESSMENT & PLAN NOTE
I suggested to consider stopping  smoking tobacco for its benefits in the renata operative period and in the long term  I  Informed about risk of wound healing problem ,infection,lung complications,thrombosis with tobacco use   Referred to tobacco cessation services

## 2018-04-26 NOTE — DISCHARGE INSTRUCTIONS
Your surgery has been scheduled for:__________________________________________    You should report to:  ____[facility], located on the [place] side of the first floor of the           [facility] ([phone])  ____[facility], located on the [facility] side of the            Second floor of the [facility] ([phone])  ____3rd Floor [facility] located on the [facility] side of the [facility] [phone]  Please Note   - Tell your doctor if you take Aspirin, products containing Aspirin, herbal medications  or blood thinners, such as Coumadin, Ticlid, or Plavix.  (Consult your provider regarding holding or stopping before surgery).  - Arrange for someone to drive you home following surgery.  You will not be allowed to leave the surgical facility alone or drive yourself home following sedation and anesthesia.  Before Surgery  - Stop taking all herbal medications 14days prior to surgery  - No Motrin/Advil (Ibuprofen) 7 days before surgery  - No Aleve (Naproxen) 7 days before surgery  - Stop Taking Asprin, products containing Asprin _____days before surgery  - Stop taking blood thinners_______days before surgery  - Refrain from drinking alcoholic beverages for 24hours before and after surgery  - Stop or limit smoking _________days before surgery  Night before Surgery  - DO NOT EAT OR DRINK ANYTHING AFTER MIDNIGHT, INCLUDING GUM, HARD CANDY, MINTS, OR CHEWING TOBACCO.  - Take a shower or bath (shower is recommended).  Bathe with Hibiclens soap or an antibacterial soap from the neck down.  If not supplied by your surgeon, hibiclens soap will need to be purchased over the counter in pharmacy.  Rinse soap off thoroughly.  - Shampoo your hair with your regular shampoo  The Day of Surgery  - Take another bath or shower with hibiclens or any antibacterial soap, to reduce the chance of infection.  - Take heart and blood  pressure medications with a small sip of water, as advised by the perioperative team.  - Do not take fluid pills  - You may brush your teeth and rinse your mouth, but do not swall any additional water.   - Do not apply perfumes, powder, body lotions or deodorant on the day of surgery.  - Nail polish should be removed.  - Do not wear makeup or moisturizer  - Wear comfortable clothes, such as a button front shirt and loose fitting pants.  - Leave all jewelry, including body piercings, and valuables at home.    - Bring any devices you will neeed after surgery such as crutches or canes.  - If you have sleep apnea, please bring your CPAP machine  In the event that your physical condition changes including the onset of a cold or respiratory illness, or if you have to delay or cancel your surgery, please notify your surgeon.Anesthesia: General Anesthesia  Youre due to have surgery. During surgery, youll be given medication called anesthesia. (It is also called anesthetic.) This will keep you comfortable and pain-free. Your anesthesia provider will use general anesthesia. This sheet tells you more about it.  What is general anesthesia?     You are watched continuously during your procedure by the anesthesia provider   General anesthesia puts you into a state like deep sleep. It goes into the bloodstream (IV anesthetics), into the lungs (gas anesthetics), or both. You feel nothing during the procedure. You will not remember it. During the procedure, the anesthesia provider monitors you continuously. He or she checks your heart rate and rhythm, blood pressure, breathing, and blood oxygen.  · IV Anesthetics. IV anesthetics are given through an IV line in your arm. Theyre often given first. This is so you are asleep before a gas anesthetic is started. Some kinds of IV anesthetics relieve pain. Others relax you. Your doctor will decide which kind is best in your case.  · Gas Anesthetics. Gas anesthetics are breathed into the  lungs. They are often used to keep you asleep. They can be given through a facemask or a tube placed in your larynx or trachea (breathing tube).  ? If you have a facemask, your anesthesia provider will most likely place it over your nose and mouth while youre still awake. Youll breathe oxygen through the mask as your IV anesthetic is started. Gas anesthetic may be added through the mask.  ? If you have a tube in the larynx or trachea, it will be inserted into your throat after youre asleep.  Anesthesia tools and medications  You will likely have:  · IV anesthetics. These are put into an IV line into your bloodstream.  · Gas anesthetics. You breathe these anesthetics into your lungs, where they pass into your bloodstream.  · Pulse oximeter. This is a small clip that is attached to the end of your finger. This measures your blood oxygen level.  · Electrocardiography leads (electrodes). These are small sticky pads that are placed on your chest. They record your heart rate and rhythm.  · Blood pressure cuff. This reads your blood pressure.  Risks and possible complications  General anesthesia has some risks. These include:  · Breathing problems  · Nausea and vomiting  · Sore throat or hoarseness (usually temporary)  · Allergic reaction to the anesthetic  · Irregular heartbeat (rare)  · Cardiac arrest (rare)   Anesthesia safety  · Follow all instructions you are given for how long not to eat or drink before your procedure.  · Be sure your doctor knows what medications and drugs you take. This includes over-the-counter medications, herbs, supplements, alcohol or other drugs. You will be asked when those were last taken.  · Have an adult family member or friend drive you home after the procedure.  · For the first 24 hours after your surgery:  ? Do not drive or use heavy equipment.  ? Have a trusted family member or spouse make important decisions or sign documents.  ? Avoid alcohol.  ? Have a responsible adult stay with  you. He or she can watch for problems and help keep you safe.  Date Last Reviewed: 10/16/2014  © 2694-0140 [employer] [address] [place] [address], [place] [address]. All rights reserved. This information is not intended as a substitute for professional medical care. Always follow your healthcare professional's instructions.

## 2018-04-26 NOTE — TELEPHONE ENCOUNTER
Spoke with pt. Pt did not get her EKG today due to transportation leaving. Pt wants to do her EKG in [place]. She will need another order placed in epic. Pt advised message will be forward to Dr. Pinto. She voiced understanding

## 2018-04-26 NOTE — OUTPATIENT SUBJECTIVE & OBJECTIVE
Outpatient Subjective & Objective     Chief complaint-Preoperative evaluation, Perioperative Medical management, complication reduction plan     Active cardiac conditions- none    Revised cardiac risk index predictors- high-risk type of surgery    Functional capacity -Examples of physical activity, uses transportation for getting to MD's and walks to the stores,helpes her 72 year old aunty can take 1 flight of stairs----- She can undertake all the above activities without  chest pain,chest tightness, Shortness of breath ,dizziness,lightheadedness making her exercise tolerance more  than 4 Mets.       Review of Systems   Constitutional: Negative for chills and fever.        As noted   HENT:        STOPBANG score 2 / 8    Elevated BP  Age over 50        Eyes:        Wears glasses   Cannot wear them as the arm of the glasses broke   Respiratory:        Dry Cough on occasions    No Hemoptysis   Cardiovascular:        As noted   Gastrointestinal:        No overt GI/ blood losses  Bowel movements- Regular    Endocrine:        Prednisone use > 20 mg daily for 3 weeks- None   Genitourinary: Negative for dysuria.        Urinary hesitancy    Musculoskeletal:          No unusual, muscle, joint pains   Skin: Negative for rash.   Neurological: Negative for syncope.        No unilateral weakness   Hematological:        Current use of Anticoagulants  Current use of Antiplatelet agents  None   Psychiatric/Behavioral:          No SI/HI     No vascular stenting   No past medical history pertinent negatives.  No family history on file.  Past Surgical History:   Procedure Laterality Date    BREAST SURGERY       SECTION, CLASSIC         No anesthesia, bleeding, cardiac problems , PONVwith previous surgeries/procedures.  Medications and Allergies reviewed in epic  FH- No anesthesia, thrombosis/ Bleeding  , early onset heart disease in family .   Stays with aunty who is going to help post op    Physical Exam   HENT:   Head:  Normocephalic.     Physical Exam   HENT:   Head: Normocephalic.     Constitutional- Vitals - There is no height or weight on file to calculate BMI., There were no vitals filed for this visit.  General appearance-Conscious,Coherent  Eyes- No conjunctival icterus,pupils  round  and  Bilateral cataracts  ENT-Oral cavity- moist  , Hearing grossly normal   Neck- No thyromegaly ,Trachea -central, No jugular venous distension,   No Carotid Bruit   Cardiovascular -Heart Sounds- Normal  and  no murmur   , No gallop rhythm   Respiratory - Normal Respiratory Effort, Normal breath sounds,  no wheeze  and  no forced expiratory wheeze    Peripheral pitting pedal edema-- none , no calf pain   Gastrointestinal -Soft abdomen, Lower abdomen- Rt side  palpable masses,  Tenderness on mass ,Liver,Spleen not palpable.No acute abdomen  No-- free fluid and shifting dullness  Musculoskeletal- No finger Clubbing. Strength grossly normal   Lymphatic-No Palpable cervical, axillary,Inguinal lymphadenopathy   Psychiatric - normal effect,Orientation  Rt Dorsalis pedis pulses-palpable    Lt Dorsalis pedis pulses- palpable   Rt Posterior tibial pulses -palpable   Left posterior tibial pulses -palpable   Miscellaneous -  no asterixis,  no renal bruit and  bowel sounds positive    No thrush   There were no vitals taken for this visit.      Investigations  Lab and Imaging have been reviewed in epic.    Review of Medicine tests    EKG-7/16/2012 personally reviewed reportedly showed    Normal sinus rhythm  Normal ECG  No previous ECGs available    CXR March 2016     No acute chest disease    Review of clinical lab tests:  Lab Results   Component Value Date    CREATININE 0.8 04/02/2018    HGB 16.8 (H) 04/02/2018     04/02/2018     Acidosis from 4/2/2018 likely from Diarrhea at that time    EGD- 3/12/2018 - Acanthosis nigricans involving the esophagus , posterior pharynx and vocal cords   Review of old records- Was done and information gathered  regards to events leading to surgery and health conditions of significance in the perioperative period.    Outpatient Subjective & Objective

## 2018-04-26 NOTE — PROGRESS NOTES
Gennaro Burrows - Pre Op Consult  Progress Note    Patient Name: Michelle Rivers  MRN: [id number]  Date of Evaluation- 04/26/2018  PCP- Serenity Aaron MD    Future cases for Michelle Rivers [[id number]]     Case ID Status Date Time David Procedure Provider Location    [id number] [facility] 5/3/2018  7:00  HYSTERECTOMY-ABDOMINAL-TOTAL (ROMAIN) Kaitlin Pinto MD [[id number]] [facility] OR 2ND FLR          HPI:  History of present illness- I had the pleasure of meeting this pleasant 53 y.o. lady in the pre op clinic prior to her elective Gynecological surgery. The patient is new to me ..    I have obtained the history by speaking to the patient and by reviewing the electronic health records.    Events leading up to surgery / History of presenting illness -    Pelvic mass    She has been troubled with moderate-severe  sharp lower abdominal   Pain on and off  for about 2 months  Pain some times comes randomly and some times , when she holds the urine      Relevant health conditions of significance for the perioperative period/ History of presenting illness -    Patient Active Problem List    Diagnosis Date Noted    Essential hypertension 04/26/2018     Lisinopril   For about a few months  No Home  Machine  Did not take BP medication this AM  To her understanding BP is controlled with Primary care MD      Tobacco abuse 04/26/2018     Tobacco use- Smokes Cigarettes- 1 PPD-since age 13 years   Not known to have COPD , no chronic Phlegm, no wheezing   She used to have Asthma/ Bronchitis many years ago and had a bad cold and came to ER ,about 3 years ago , did not require hospitalization   Had an Injection ( ? Steroid) and was send home on Cough Medicine and inhaler   Tried Inhaler , but she is no longer using it as she had head ache with inhaler   No longer experiencing lung problems since them      HIV (human immunodeficiency virus infection) 04/26/2018     History of Intra venous drug abuse about 20 years ago   Stopped IVDU 20 years ago  Smoked  crack cocaine until 4 years ago   Diagnosed about 20 years ago   Follows with infectious disease at [place] , Last seen about 3 months ago   Not on HIV treatment   Never was on treatment   To her understanding she did not need HIV treatment   No history of Tuberculosis , Thrush or Proneness to Infection   Un aware of CD4 count         Hepatitis C 04/26/2018     Diagnosed many years ago   Following ID  As per patient , plan is for commencement of treatment after Gynecological surgery   Not known to have Cirrhosis of liver ?  Not known to have liver failure       Acid reflux 04/26/2018     With certain foods   Controlled  Not needing Medication       Depression 04/26/2018     Trazodone helping sleeping  Under Primary care   Plans on following with  , Psychiatrist after Gynecological surgery       Lower urinary tract symptoms (LUTS) 04/26/2018     Like;y from pelvic mass      Elevated hemoglobin- 4/2/2018 04/26/2018     In the setting of diarrhea   Likely from Hemoconcentration       Elevated total protein 04/26/2018     Likely from Infective illness   Not known to have Myeloma       Acanthosis nigricans 04/26/2018     EGD- 3/12/2018 - Acanthosis nigricans involving the esophagus , posterior pharynx and vocal cords       Pelvic mass 03/06/2018     Started with diarrhea, 3-4 months ago , had it for 2 months   Watery diarrhea , lost about 30 pounds since the beginning of the year   Had it bad and had to use Diaper at night time   On coughing used to have diarrhea   Had diarrhea evaluated and was found to have pelvic mass   Also had mild vaginal bleeding ( noticed on wiping ) on and off for 1-2 months  No longer troubled with diarrhea, appetite and weight picking   Had problems urinating , Hesitancy and feeling on incomplete bladder emptying   Primary complaint is diarrhea. And weight loss.       Imaging  CT 2/27/18 14.8cm predominately cystic pelvic mass with questionable solid component likely  arising from the left ovary. No adenopathy or ascites.   US 18 16.2cm complex cystic pelvic mass, 6.3cm solid mass, unable to delineate uterus and ovaries            Subjective/ Objective:          Chief complaint-Preoperative evaluation, Perioperative Medical management, complication reduction plan     Active cardiac conditions- none    Revised cardiac risk index predictors- high-risk type of surgery    Functional capacity -Examples of physical activity, uses transportation for getting to MD's and walks to the stores,helpes her 72 year old aunty can take 1 flight of stairs----- She can undertake all the above activities without  chest pain,chest tightness, Shortness of breath ,dizziness,lightheadedness making her exercise tolerance more  than 4 Mets.       Review of Systems   Constitutional: Negative for chills and fever.        As noted   HENT:        STOPBANG score 2 / 8    Elevated BP  Age over 50        Eyes:        Wears glasses   Cannot wear them as the arm of the glasses broke   Respiratory:        Dry Cough on occasions    No Hemoptysis   Cardiovascular:        As noted   Gastrointestinal:        No overt GI/ blood losses  Bowel movements- Regular    Endocrine:        Prednisone use > 20 mg daily for 3 weeks- None   Genitourinary: Negative for dysuria.        Urinary hesitancy    Musculoskeletal:          No unusual, muscle, joint pains   Skin: Negative for rash.   Neurological: Negative for syncope.        No unilateral weakness   Hematological:        Current use of Anticoagulants  Current use of Antiplatelet agents  None   Psychiatric/Behavioral:          No SI/HI     No vascular stenting   No past medical history pertinent negatives.  No family history on file.  Past Surgical History:   Procedure Laterality Date    BREAST SURGERY       SECTION, CLASSIC         No anesthesia, bleeding, cardiac problems , PONVwith previous surgeries/procedures.  Medications and Allergies reviewed in  epic  FH- No anesthesia, thrombosis/ Bleeding  , early onset heart disease in family .   Stays with aunty who is going to help post op    Physical Exam   HENT:   Head: Normocephalic.     Physical Exam   HENT:   Head: Normocephalic.     Constitutional- Vitals - There is no height or weight on file to calculate BMI., There were no vitals filed for this visit.  General appearance-Conscious,Coherent  Eyes- No conjunctival icterus,pupils  round  and  Bilateral cataracts  ENT-Oral cavity- moist  , Hearing grossly normal   Neck- No thyromegaly ,Trachea -central, No jugular venous distension,   No Carotid Bruit   Cardiovascular -Heart Sounds- Normal  and  no murmur   , No gallop rhythm   Respiratory - Normal Respiratory Effort, Normal breath sounds,  no wheeze  and  no forced expiratory wheeze    Peripheral pitting pedal edema-- none , no calf pain   Gastrointestinal -Soft abdomen, Lower abdomen- Rt side  palpable masses,  Tenderness on mass ,Liver,Spleen not palpable.No acute abdomen  No-- free fluid and shifting dullness  Musculoskeletal- No finger Clubbing. Strength grossly normal   Lymphatic-No Palpable cervical, axillary,Inguinal lymphadenopathy   Psychiatric - normal effect,Orientation  Rt Dorsalis pedis pulses-palpable    Lt Dorsalis pedis pulses- palpable   Rt Posterior tibial pulses -palpable   Left posterior tibial pulses -palpable   Miscellaneous -  no asterixis,  no renal bruit and  bowel sounds positive    No thrush   There were no vitals taken for this visit.      Investigations  Lab and Imaging have been reviewed in [facility].    Review of Medicine tests    EKG-7/16/2012 personally reviewed reportedly showed    Normal sinus rhythm  Normal ECG  No previous ECGs available    CXR March 2016     No acute chest disease    Review of clinical lab tests:  Lab Results   Component Value Date    CREATININE 0.8 04/02/2018    HGB 16.8 (H) 04/02/2018     04/02/2018     Acidosis from 4/2/2018 likely from Diarrhea at that  time    EGD- 3/12/2018 - Acanthosis nigricans involving the esophagus , posterior pharynx and vocal cords   Review of old records- Was done and information gathered regards to events leading to surgery and health conditions of significance in the perioperative period.        Preoperative cardiac risk assessment-  The patient does not have any active cardiac conditions . Revised cardiac risk index predictors- 1--.Functional capacity is more than 4 Mets. She will be undergoing a Gynecological procedure that carries a high risk     The estimated risk of the rate of adverse cardiac outcomes  0.9%    No further cardiac work up is indicated prior to proceeding with the surgery     Orders Placed This Encounter    CBC auto differential    Hemoglobin A1c    Ambulatory referral to Smoking Cessation Program       American Society of Anesthesiologists Physical status classification ( ASA ) class: 3     Postoperative pulmonary complication risk assessment:      ARISCAT ( Canet) risk index- risk class -  Low, if duration of surgery is under 3 hours, intermediate, if duration of surgery is over 3 hours      Gregory Respiratory failure index- percentage risk of respiratory failure: 0.5 %    Assessment/Plan:     Essential hypertension  Hypertension-  Blood pressure is acceptable .  I suggest holding -Lisinopril- on the morning of the surgery and can continue that  post operatively under blood pressure, electrolyte and renal function monitoring as long as they are acceptable.I suggest addressing pain control as uncontrolled pain can increased blood pressure     Tobacco abuse    I suggested to consider stopping  smoking tobacco for its benefits in the renata operative period and in the long term  I  Informed about risk of wound healing problem ,infection,lung complications,thrombosis with tobacco use   Referred to tobacco cessation services     HIV (human immunodeficiency virus infection)  Will obtain ID records   Prefer that her  CD4 count be over 200 for surgery , but would not delay the surgery for CD4 count , given the pelvic mass   Suggested follow up    Hepatitis C  CT from April 2018 showed   liver and spleen  unremarkable.    No ascites within the abdomen.  No suggestion of liver / hepatic decompensation or portal Hypertension    Acid reflux   I suggest aspiration precautions    Pelvic mass  For surgery     Elevated hemoglobin- 4/2/2018   Will re check     Elevated total protein  Suggested follow up     Acanthosis nigricans  Will check A1c         Preventive perioperative care    Thromboembolic prophylaxis:  Her risk factors for thrombosis include tobacco use, surgical procedure and age.I suggest  thromboembolic prophylaxis ( mechanical/pharmacological, weighing the risk benefits of pharmacological agent use considering renata procedural bleeding )  during the perioperative period.I suggested being active in the post operative period.      Postoperative pulmonary complication prophylaxis-Risk factors for post operative pulmonary complications include ASA class >2, tobacco use and proximity of the surgical site to the lungs- I suggest incentive spirometry use, early ambulation and pain control so as to avoid diaphragmatic splinting  , oral care , head end of bed elevation     Renal complication prophylaxis-Risk factors for renal complications include hypertension . I suggest keeping her well hydrated.I suggested drinking 2 litre's of water a day      Surgical site Infection Prophylaxis-I  suggest appropriate antibiotic for Prophylaxis against Surgical site infections      In view of gynecological procedure the patient  is at risk of postoperative urinary retention.  I suggest avoidance / minimizing the of  Benzodiazepines,Anticholinergic medication,antihistamines ( Benadryl) , if possible in the perioperative period. I suggest using the minimum possible use of opioids for the minimum period of time in the perioperative period. Benadryl  avoidance suggested      This visit was focused on Preoperative evaluation, Perioperative Medical management, complication reduction plans. I suggest that the patient follows up with primary care or relevant sub specialists for ongoing health care.    I appreciate the opportunity to be involved in this patients care. Please feel free to contact me if there were any questions about this consultation.    Patient is optimized     Patient  was instructed to call and update me about any changes to health, changes to medication, office visits ,testing out side of the renata operative care center , hospitalizations between now and surgery     Heather Valdez MD  Perioperative Medicine  [facility]   Pager [phone]  ----    4/26- 17 14     /80, 98.2, sat 99 %  INR-N    A1C- 4.9   ------  4/27- 1331    ID records reviewed   Left Humerus fracture ,S/P ORIF 2010  US from  2/22/2017 - Spleen normal in appearance     EKG from 10/24/2017 - personally reviewed- Difficult to see - no acute changes     Pathology from EGD from 3/12/2018-no mention of Acanthosis Nigricans  ---------------------------  5/2-- 6 29     5/2/2018- 6 32   On recheck  Hb,HCT,Plt , WCC-N  ANC ( granulocyte , mildly low)  ------  5/2- 13 24     EKG from 5/1 personally reviewed reportedly showed  Normal sinus rhythm  Normal ECG  When compared with ECG of 16-JUL-2012 01:11,  No significant change was found    Called to follow up   Spoke to patient   Doing good ,No changes to Medication, Health   Regarding Low ANC count - no history of recurrent infections  Suggested follow up   Holding Lisinopril AM of surgery

## 2018-04-26 NOTE — TELEPHONE ENCOUNTER
----- Message from Ewa Pang sent at 4/26/2018  3:31 PM CDT -----  Contact: pt            Name of Who is Calling: pt      What is the request in detail: in regards to her pre op for surgery. Pt is asking can she take her EKG at [facility]      Can the clinic reply by [facility]: no      What Number to Call Back [facility] not [facility]: [phone]

## 2018-04-26 NOTE — ASSESSMENT & PLAN NOTE
Hypertension-  Blood pressure is acceptable .  I suggest holding -Lisinopril- on the morning of the surgery and can continue that  post operatively under blood pressure, electrolyte and renal function monitoring as long as they are acceptable.I suggest addressing pain control as uncontrolled pain can increased blood pressure

## 2018-04-27 ENCOUNTER — TELEPHONE (OUTPATIENT)
Dept: GYNECOLOGIC ONCOLOGY | Facility: CLINIC | Age: 53
End: 2018-04-27

## 2018-04-27 DIAGNOSIS — Z01.818 PRE-OP TESTING: Primary | ICD-10-CM

## 2018-04-27 NOTE — TELEPHONE ENCOUNTER
----- Message from Kaitlin Pinto MD sent at 4/27/2018  1:35 PM CDT -----  Contact: pt  Okay, thank you. New EKG order placed.   ----- Message -----  From: Nehal Comer MA  Sent: 4/26/2018   3:40 PM  To: Kaitlin Pinto MD    Spoke with pt. Pt did not get her EKG today due to transportation leaving. Pt wants to do her EKG in [place]. She will need another order placed in epic.      ----- Message -----  From: Ewa Pang  Sent: 4/26/2018   3:31 PM  To: Blair Madrigal Staff              Name of Who is Calling: pt      What is the request in detail: in regards to her pre op for surgery. Pt is asking can she take her EKG at [facility]      Can the clinic reply by [facility]: no      What Number to [facility] Back [facility]: [phone]

## 2018-04-27 NOTE — TELEPHONE ENCOUNTER
Spoke with pt. Pt EKG scheduled for Tuesday 5/1/18 at [facility]. Pt says thank you

## 2018-05-01 ENCOUNTER — HOSPITAL ENCOUNTER (OUTPATIENT)
Dept: CARDIOLOGY | Facility: CLINIC | Age: 53
Discharge: HOME | End: 2018-05-01
Payer: MEDICAID

## 2018-05-01 DIAGNOSIS — Z01.818 PRE-OP TESTING: ICD-10-CM

## 2018-05-01 PROCEDURE — 93005 ELECTROCARDIOGRAM TRACING: CPT | Mod: PBBFAC | Performed by: INTERNAL MEDICINE

## 2018-05-01 PROCEDURE — 93010 ELECTROCARDIOGRAM REPORT: CPT | Mod: S$PBB,,, | Performed by: INTERNAL MEDICINE

## 2018-05-02 ENCOUNTER — TELEPHONE (OUTPATIENT)
Dept: GYNECOLOGIC ONCOLOGY | Facility: CLINIC | Age: 53
End: 2018-05-02

## 2018-05-02 PROBLEM — D58.2 ELEVATED HEMOGLOBIN: Status: RESOLVED | Noted: 2018-04-26 | Resolved: 2018-05-02

## 2018-05-02 PROBLEM — R79.89 ABNORMAL CBC: Status: ACTIVE | Noted: 2018-05-02

## 2018-05-02 NOTE — TELEPHONE ENCOUNTER
Spoke with pt aunt. She was informed pt is to go to the second floor surgery center in the [facility] hospital Thursday 5/3/18 for 5 am. She voiced understanding

## 2018-05-02 NOTE — HPI
Michelle Rivers is a 53 y.o. here for scheduled ROMAIN.     Per last clinic visit:  HPI  Referred from Dr. Norma Saunders in [place] for pelvic mass. P1. Primary complaint is diarrhea. And weight loss.       Imaging reviewed.  CT 2/27/18 14.8cm predominately cystic pelvic mass with questionable solid component likely arising from the left ovary. No adenopathy or ascites.   US 2/28/18 16.2cm complex cystic pelvic mass, 6.3cm solid mass, unable to delineate uterus and ovaries      Labs reviewed:   normal per notes.   hgb 14.7  Cr 0.75     Medical history significant for HIV/Hep C, HTN, tobacco use.      Prior abdominal surgeries include c/s x 1.      Colonoscopy scheduled for 3/12/18.      MMG per patient normal a couple months ago.      Denies family history of breast, colon, uterine, or ovarian cancer.     Presents today for follow up. Colonoscopy 3/2018 normal per patient. CEA 5.5   CT 3/19/18  Impression   Complex heterogeneous pelvic mass consistent with history of ovarian neoplasm.  Mild distention of the bilateral renal collecting systems likely due to mass effect from a pelvic mass.

## 2018-05-02 NOTE — ASSESSMENT & PLAN NOTE
- Procedure complications: **  - EBL: ** cc  - IVF @ 125 cc/h; d/c when tolerating PO  - Diet: CLD, ADAT to regular  - Pain control: IV ibuprofen/tylenol, oxyIR 5/10 mg, 0.5 mg dilaudid BTP  - In/Out: Sahu in place draining clear yellow urine; d/c Sahu in AM POD #1  - Bowel function: -flatus/-BM, as expected POD #0  - PRN: simethicone, zofran, phenergan, benadryl  - Heme: Preop CBC 4.4/15.2/47.7/180; AM CBC, CMP ordered  - PPX: ambulation encouraged, IS encouraged, ppx lovenox to begin POD #1, TEDs/SCDs in place    Disposition: Will plan to evaluate the patient as she meets her post-operative milestones. Will tentatively plan for discharge to home POD #2.

## 2018-05-02 NOTE — ASSESSMENT & PLAN NOTE
History of Intra venous drug abuse about 20 years ago   Stopped IVDU 20 years ago  Smoked crack cocaine until 4 years ago  Diagnosed about 20 years ago   Follows with infectious disease at [place] , Last seen about 3 months ago   Not on HIV treatment, never was on treatment   To her understanding she did not need HIV treatment   No history of Tuberculosis, Thrush or Proneness to Infection   Unaware of CD4 count

## 2018-05-02 NOTE — HOSPITAL COURSE
05/03/2018: To OR for planned procedures: ROMAIN/BSO,fibroid uterus. Uncomplicated procedure.  cc. Doing well post-operatively. Tolerating sips. Still on IVF at 125. Pain controlled. Sahu in place draining clear yellow urine.  05/04/2018 POD #1 s/p X-lap/ROMAIN/BSO. Doing well. Pain well controlled overnight. Tolerating PO. Voiding well s/p Sahu. Ambulating in the room. -flatus/-BM.   05/04/2018 POD #1. Meeting all post-operative milestones. Tolerating PO, pain well controlled, ambulating, voiding, passing flatus. Plan for discharge to home today. Routine follow up in clinic with Dr. Pinto.  05/05/2018 POD #2. Doing well. Overnight, patient pulse ox reading in the high 80s when sleeping. RN called requesting 2L NC. Patient O2 saturation improved. She had no complaint at the time: no SOB, no HA, no lightheadedness/dizziness. This AM in low to mid 90s on RA. Reports adequate pain control. BP starting to return to baseline: ok to restart HTN meds on discharge. Patient medically stable and ready for discharge to home today having met all her postop milestones.

## 2018-05-03 ENCOUNTER — ANESTHESIA (OUTPATIENT)
Dept: SURGERY | Facility: HOSPITAL | Age: 53
DRG: 743 | End: 2018-05-03
Payer: MEDICAID

## 2018-05-03 ENCOUNTER — HOSPITAL ENCOUNTER (INPATIENT)
Facility: HOSPITAL | Age: 53
LOS: 2 days | Discharge: HOME | DRG: 743 | End: 2018-05-05
Attending: OBSTETRICS & GYNECOLOGY | Admitting: OBSTETRICS & GYNECOLOGY
Payer: MEDICAID

## 2018-05-03 ENCOUNTER — SURGERY (OUTPATIENT)
Age: 53
End: 2018-05-03

## 2018-05-03 DIAGNOSIS — Z90.710 S/P TAH-BSO: ICD-10-CM

## 2018-05-03 DIAGNOSIS — Z90.722 S/P TAH-BSO: ICD-10-CM

## 2018-05-03 DIAGNOSIS — Z90.79 S/P TAH-BSO: ICD-10-CM

## 2018-05-03 DIAGNOSIS — R19.00 PELVIC MASS: Primary | ICD-10-CM

## 2018-05-03 LAB
BASOPHILS # BLD AUTO: 0.03 K/UL
BASOPHILS NFR BLD: 0.5 %
DIFFERENTIAL METHOD: ABNORMAL
EOSINOPHIL # BLD AUTO: 0 K/UL
EOSINOPHIL NFR BLD: 0.5 %
ERYTHROCYTE [DISTWIDTH] IN BLOOD BY AUTOMATED COUNT: 14.7 %
HCT VFR BLD AUTO: 41.3 %
HGB BLD-MCNC: 13.3 G/DL
IMM GRANULOCYTES # BLD AUTO: 0.01 K/UL
IMM GRANULOCYTES NFR BLD AUTO: 0.2 %
LYMPHOCYTES # BLD AUTO: 1.8 K/UL
LYMPHOCYTES NFR BLD: 30.3 %
MCH RBC QN AUTO: 30.9 PG
MCHC RBC AUTO-ENTMCNC: 32.2 G/DL
MCV RBC AUTO: 96 FL
MONOCYTES # BLD AUTO: 0.4 K/UL
MONOCYTES NFR BLD: 6.5 %
NEUTROPHILS # BLD AUTO: 3.6 K/UL
NEUTROPHILS NFR BLD: 62 %
NRBC BLD-RTO: 0 /100 WBC
PLATELET # BLD AUTO: 157 K/UL
PMV BLD AUTO: 10.1 FL
POCT GLUCOSE: 93 MG/DL (ref 70–110)
RBC # BLD AUTO: 4.31 M/UL
WBC # BLD AUTO: 5.84 K/UL

## 2018-05-03 PROCEDURE — 63600175 PHARM REV CODE 636 W HCPCS: Performed by: STUDENT IN AN ORGANIZED HEALTH CARE EDUCATION/TRAINING PROGRAM

## 2018-05-03 PROCEDURE — 37000009 HC ANESTHESIA EA ADD 15 MINS: Performed by: OBSTETRICS & GYNECOLOGY

## 2018-05-03 PROCEDURE — 71000033 HC RECOVERY, INTIAL HOUR: Performed by: OBSTETRICS & GYNECOLOGY

## 2018-05-03 PROCEDURE — 0UT70ZZ RESECTION OF BILATERAL FALLOPIAN TUBES, OPEN APPROACH: ICD-10-PCS | Performed by: OBSTETRICS & GYNECOLOGY

## 2018-05-03 PROCEDURE — 37000008 HC ANESTHESIA 1ST 15 MINUTES: Performed by: OBSTETRICS & GYNECOLOGY

## 2018-05-03 PROCEDURE — 49205 PR EXCISION/DESTRUCTION OPEN ABDOMINAL TUMORS >10.0 CM: CPT | Mod: ,,, | Performed by: OBSTETRICS & GYNECOLOGY

## 2018-05-03 PROCEDURE — C9290 INJ, BUPIVACAINE LIPOSOME: HCPCS | Performed by: OBSTETRICS & GYNECOLOGY

## 2018-05-03 PROCEDURE — 25000003 PHARM REV CODE 250: Performed by: NURSE ANESTHETIST, CERTIFIED REGISTERED

## 2018-05-03 PROCEDURE — 36000708 HC OR TIME LEV III 1ST 15 MIN: Performed by: OBSTETRICS & GYNECOLOGY

## 2018-05-03 PROCEDURE — 88305 TISSUE EXAM BY PATHOLOGIST: CPT | Mod: 26,,, | Performed by: PATHOLOGY

## 2018-05-03 PROCEDURE — 25000003 PHARM REV CODE 250: Performed by: OBSTETRICS & GYNECOLOGY

## 2018-05-03 PROCEDURE — 86920 COMPATIBILITY TEST SPIN: CPT

## 2018-05-03 PROCEDURE — 58150 TOTAL HYSTERECTOMY: CPT | Mod: 51,,, | Performed by: OBSTETRICS & GYNECOLOGY

## 2018-05-03 PROCEDURE — 25000003 PHARM REV CODE 250: Performed by: STUDENT IN AN ORGANIZED HEALTH CARE EDUCATION/TRAINING PROGRAM

## 2018-05-03 PROCEDURE — 85025 COMPLETE CBC W/AUTO DIFF WBC: CPT

## 2018-05-03 PROCEDURE — 71000039 HC RECOVERY, EACH ADD'L HOUR: Performed by: OBSTETRICS & GYNECOLOGY

## 2018-05-03 PROCEDURE — 88305 TISSUE EXAM BY PATHOLOGIST: CPT | Performed by: PATHOLOGY

## 2018-05-03 PROCEDURE — 63600175 PHARM REV CODE 636 W HCPCS: Performed by: OBSTETRICS & GYNECOLOGY

## 2018-05-03 PROCEDURE — 20600001 HC STEP DOWN PRIVATE ROOM

## 2018-05-03 PROCEDURE — 88307 TISSUE EXAM BY PATHOLOGIST: CPT | Mod: 26,,, | Performed by: PATHOLOGY

## 2018-05-03 PROCEDURE — 63600175 PHARM REV CODE 636 W HCPCS: Performed by: NURSE ANESTHETIST, CERTIFIED REGISTERED

## 2018-05-03 PROCEDURE — 63600175 PHARM REV CODE 636 W HCPCS: Performed by: ANESTHESIOLOGY

## 2018-05-03 PROCEDURE — D9220A PRA ANESTHESIA: Mod: CRNA,,, | Performed by: NURSE ANESTHETIST, CERTIFIED REGISTERED

## 2018-05-03 PROCEDURE — C9113 INJ PANTOPRAZOLE SODIUM, VIA: HCPCS | Performed by: STUDENT IN AN ORGANIZED HEALTH CARE EDUCATION/TRAINING PROGRAM

## 2018-05-03 PROCEDURE — 0UT20ZZ RESECTION OF BILATERAL OVARIES, OPEN APPROACH: ICD-10-PCS | Performed by: OBSTETRICS & GYNECOLOGY

## 2018-05-03 PROCEDURE — 36415 COLL VENOUS BLD VENIPUNCTURE: CPT

## 2018-05-03 PROCEDURE — 82962 GLUCOSE BLOOD TEST: CPT | Performed by: OBSTETRICS & GYNECOLOGY

## 2018-05-03 PROCEDURE — 36000709 HC OR TIME LEV III EA ADD 15 MIN: Performed by: OBSTETRICS & GYNECOLOGY

## 2018-05-03 PROCEDURE — 94761 N-INVAS EAR/PLS OXIMETRY MLT: CPT

## 2018-05-03 PROCEDURE — 0UT90ZZ RESECTION OF UTERUS, OPEN APPROACH: ICD-10-PCS | Performed by: OBSTETRICS & GYNECOLOGY

## 2018-05-03 PROCEDURE — D9220A PRA ANESTHESIA: Mod: ANES,,, | Performed by: ANESTHESIOLOGY

## 2018-05-03 RX ORDER — MIDAZOLAM HYDROCHLORIDE 1 MG/ML
INJECTION, SOLUTION INTRAMUSCULAR; INTRAVENOUS
Status: DISCONTINUED | OUTPATIENT
Start: 2018-05-03 | End: 2018-05-03

## 2018-05-03 RX ORDER — IPRATROPIUM BROMIDE AND ALBUTEROL SULFATE 2.5; .5 MG/3ML; MG/3ML
3 SOLUTION RESPIRATORY (INHALATION) EVERY 4 HOURS PRN
Status: DISCONTINUED | OUTPATIENT
Start: 2018-05-03 | End: 2018-05-05 | Stop reason: HOSPADM

## 2018-05-03 RX ORDER — AMOXICILLIN 250 MG
1 CAPSULE ORAL 2 TIMES DAILY
Status: DISCONTINUED | OUTPATIENT
Start: 2018-05-03 | End: 2018-05-05 | Stop reason: HOSPADM

## 2018-05-03 RX ORDER — METOCLOPRAMIDE HYDROCHLORIDE 5 MG/ML
5 INJECTION INTRAMUSCULAR; INTRAVENOUS EVERY 6 HOURS PRN
Status: DISCONTINUED | OUTPATIENT
Start: 2018-05-03 | End: 2018-05-05 | Stop reason: HOSPADM

## 2018-05-03 RX ORDER — HYDRALAZINE HYDROCHLORIDE 20 MG/ML
5 INJECTION INTRAMUSCULAR; INTRAVENOUS EVERY 6 HOURS PRN
Status: DISCONTINUED | OUTPATIENT
Start: 2018-05-03 | End: 2018-05-05 | Stop reason: HOSPADM

## 2018-05-03 RX ORDER — CEFAZOLIN SODIUM 1 G/3ML
2 INJECTION, POWDER, FOR SOLUTION INTRAMUSCULAR; INTRAVENOUS
Status: COMPLETED | OUTPATIENT
Start: 2018-05-03 | End: 2018-05-03

## 2018-05-03 RX ORDER — GLYCOPYRROLATE 0.2 MG/ML
INJECTION INTRAMUSCULAR; INTRAVENOUS
Status: DISCONTINUED | OUTPATIENT
Start: 2018-05-03 | End: 2018-05-03

## 2018-05-03 RX ORDER — LIDOCAINE HCL/PF 100 MG/5ML
SYRINGE (ML) INTRAVENOUS
Status: DISCONTINUED | OUTPATIENT
Start: 2018-05-03 | End: 2018-05-03

## 2018-05-03 RX ORDER — HYDROMORPHONE HYDROCHLORIDE 1 MG/ML
0.2 INJECTION, SOLUTION INTRAMUSCULAR; INTRAVENOUS; SUBCUTANEOUS EVERY 5 MIN PRN
Status: DISCONTINUED | OUTPATIENT
Start: 2018-05-03 | End: 2018-05-03 | Stop reason: HOSPADM

## 2018-05-03 RX ORDER — NICOTINE 7MG/24HR
1 PATCH, TRANSDERMAL 24 HOURS TRANSDERMAL DAILY
Status: DISCONTINUED | OUTPATIENT
Start: 2018-05-03 | End: 2018-05-05 | Stop reason: HOSPADM

## 2018-05-03 RX ORDER — SODIUM CHLORIDE 9 MG/ML
INJECTION, SOLUTION INTRAVENOUS CONTINUOUS
Status: DISCONTINUED | OUTPATIENT
Start: 2018-05-03 | End: 2018-05-03

## 2018-05-03 RX ORDER — SODIUM CHLORIDE, SODIUM LACTATE, POTASSIUM CHLORIDE, CALCIUM CHLORIDE 600; 310; 30; 20 MG/100ML; MG/100ML; MG/100ML; MG/100ML
INJECTION, SOLUTION INTRAVENOUS CONTINUOUS
Status: DISCONTINUED | OUTPATIENT
Start: 2018-05-03 | End: 2018-05-05

## 2018-05-03 RX ORDER — OXYCODONE HYDROCHLORIDE 5 MG/1
10 TABLET ORAL EVERY 4 HOURS PRN
Status: DISCONTINUED | OUTPATIENT
Start: 2018-05-03 | End: 2018-05-05

## 2018-05-03 RX ORDER — METOCLOPRAMIDE HYDROCHLORIDE 5 MG/ML
10 INJECTION INTRAMUSCULAR; INTRAVENOUS EVERY 10 MIN PRN
Status: COMPLETED | OUTPATIENT
Start: 2018-05-03 | End: 2018-05-03

## 2018-05-03 RX ORDER — DEXAMETHASONE SODIUM PHOSPHATE 4 MG/ML
INJECTION, SOLUTION INTRA-ARTICULAR; INTRALESIONAL; INTRAMUSCULAR; INTRAVENOUS; SOFT TISSUE
Status: DISCONTINUED | OUTPATIENT
Start: 2018-05-03 | End: 2018-05-03

## 2018-05-03 RX ORDER — ROCURONIUM BROMIDE 10 MG/ML
INJECTION, SOLUTION INTRAVENOUS
Status: DISCONTINUED | OUTPATIENT
Start: 2018-05-03 | End: 2018-05-03

## 2018-05-03 RX ORDER — ONDANSETRON 8 MG/1
8 TABLET, ORALLY DISINTEGRATING ORAL EVERY 8 HOURS PRN
Status: DISCONTINUED | OUTPATIENT
Start: 2018-05-03 | End: 2018-05-05 | Stop reason: HOSPADM

## 2018-05-03 RX ORDER — MUPIROCIN 20 MG/G
1 OINTMENT TOPICAL 2 TIMES DAILY
Status: DISCONTINUED | OUTPATIENT
Start: 2018-05-03 | End: 2018-05-05 | Stop reason: HOSPADM

## 2018-05-03 RX ORDER — FENTANYL CITRATE 50 UG/ML
INJECTION, SOLUTION INTRAMUSCULAR; INTRAVENOUS
Status: DISCONTINUED | OUTPATIENT
Start: 2018-05-03 | End: 2018-05-03

## 2018-05-03 RX ORDER — ACETAMINOPHEN 10 MG/ML
1000 INJECTION, SOLUTION INTRAVENOUS EVERY 8 HOURS
Status: DISPENSED | OUTPATIENT
Start: 2018-05-03 | End: 2018-05-04

## 2018-05-03 RX ORDER — NEOSTIGMINE METHYLSULFATE 1 MG/ML
INJECTION, SOLUTION INTRAVENOUS
Status: DISCONTINUED | OUTPATIENT
Start: 2018-05-03 | End: 2018-05-03

## 2018-05-03 RX ORDER — OXYCODONE HYDROCHLORIDE 5 MG/1
5 TABLET ORAL EVERY 4 HOURS PRN
Status: DISCONTINUED | OUTPATIENT
Start: 2018-05-03 | End: 2018-05-05

## 2018-05-03 RX ORDER — HYDROMORPHONE HYDROCHLORIDE 1 MG/ML
0.5 INJECTION, SOLUTION INTRAMUSCULAR; INTRAVENOUS; SUBCUTANEOUS
Status: DISCONTINUED | OUTPATIENT
Start: 2018-05-03 | End: 2018-05-05 | Stop reason: HOSPADM

## 2018-05-03 RX ORDER — LORAZEPAM 2 MG/ML
0.25 INJECTION INTRAMUSCULAR ONCE AS NEEDED
Status: DISCONTINUED | OUTPATIENT
Start: 2018-05-03 | End: 2018-05-03 | Stop reason: HOSPADM

## 2018-05-03 RX ORDER — PROPOFOL 10 MG/ML
VIAL (ML) INTRAVENOUS
Status: DISCONTINUED | OUTPATIENT
Start: 2018-05-03 | End: 2018-05-03

## 2018-05-03 RX ORDER — LIDOCAINE HYDROCHLORIDE 10 MG/ML
1 INJECTION, SOLUTION EPIDURAL; INFILTRATION; INTRACAUDAL; PERINEURAL ONCE
Status: COMPLETED | OUTPATIENT
Start: 2018-05-03 | End: 2018-05-03

## 2018-05-03 RX ORDER — BUPIVACAINE HYDROCHLORIDE 2.5 MG/ML
INJECTION, SOLUTION EPIDURAL; INFILTRATION; INTRACAUDAL
Status: DISCONTINUED | OUTPATIENT
Start: 2018-05-03 | End: 2018-05-03

## 2018-05-03 RX ADMIN — SODIUM CHLORIDE: 0.9 INJECTION, SOLUTION INTRAVENOUS at 05:05

## 2018-05-03 RX ADMIN — SODIUM CHLORIDE, SODIUM GLUCONATE, SODIUM ACETATE, POTASSIUM CHLORIDE, MAGNESIUM CHLORIDE, SODIUM PHOSPHATE, DIBASIC, AND POTASSIUM PHOSPHATE: .53; .5; .37; .037; .03; .012; .00082 INJECTION, SOLUTION INTRAVENOUS at 07:05

## 2018-05-03 RX ADMIN — METOCLOPRAMIDE 10 MG: 5 INJECTION, SOLUTION INTRAMUSCULAR; INTRAVENOUS at 11:05

## 2018-05-03 RX ADMIN — CEFAZOLIN 2 G: 330 INJECTION, POWDER, FOR SOLUTION INTRAMUSCULAR; INTRAVENOUS at 07:05

## 2018-05-03 RX ADMIN — SODIUM CHLORIDE, SODIUM GLUCONATE, SODIUM ACETATE, POTASSIUM CHLORIDE, MAGNESIUM CHLORIDE, SODIUM PHOSPHATE, DIBASIC, AND POTASSIUM PHOSPHATE: .53; .5; .37; .037; .03; .012; .00082 INJECTION, SOLUTION INTRAVENOUS at 08:05

## 2018-05-03 RX ADMIN — BUPIVACAINE HYDROCHLORIDE 30 ML: 2.5 INJECTION, SOLUTION EPIDURAL; INFILTRATION; INTRACAUDAL; PERINEURAL at 09:05

## 2018-05-03 RX ADMIN — STANDARDIZED SENNA CONCENTRATE AND DOCUSATE SODIUM 1 TABLET: 8.6; 5 TABLET, FILM COATED ORAL at 08:05

## 2018-05-03 RX ADMIN — DEXTROSE 40 MG: 50 INJECTION, SOLUTION INTRAVENOUS at 11:05

## 2018-05-03 RX ADMIN — FENTANYL CITRATE 25 MCG: 50 INJECTION, SOLUTION INTRAMUSCULAR; INTRAVENOUS at 10:05

## 2018-05-03 RX ADMIN — Medication 0.2 MG: at 11:05

## 2018-05-03 RX ADMIN — FENTANYL CITRATE 100 MCG: 50 INJECTION, SOLUTION INTRAMUSCULAR; INTRAVENOUS at 07:05

## 2018-05-03 RX ADMIN — OXYCODONE HYDROCHLORIDE 10 MG: 5 TABLET ORAL at 08:05

## 2018-05-03 RX ADMIN — MIDAZOLAM HYDROCHLORIDE 2 MG: 1 INJECTION, SOLUTION INTRAMUSCULAR; INTRAVENOUS at 06:05

## 2018-05-03 RX ADMIN — FENTANYL CITRATE 150 MCG: 50 INJECTION, SOLUTION INTRAMUSCULAR; INTRAVENOUS at 07:05

## 2018-05-03 RX ADMIN — LIDOCAINE HYDROCHLORIDE 2 MG: 10 INJECTION, SOLUTION EPIDURAL; INFILTRATION; INTRACAUDAL; PERINEURAL at 05:05

## 2018-05-03 RX ADMIN — BUPIVACAINE 20 ML: 13.3 INJECTION, SUSPENSION, LIPOSOMAL INFILTRATION at 09:05

## 2018-05-03 RX ADMIN — FENTANYL CITRATE 100 MCG: 50 INJECTION, SOLUTION INTRAMUSCULAR; INTRAVENOUS at 08:05

## 2018-05-03 RX ADMIN — ROCURONIUM BROMIDE 20 MG: 10 INJECTION, SOLUTION INTRAVENOUS at 09:05

## 2018-05-03 RX ADMIN — IBUPROFEN 400 MG: 800 INJECTION INTRAVENOUS at 11:05

## 2018-05-03 RX ADMIN — IBUPROFEN 400 MG: 800 INJECTION INTRAVENOUS at 06:05

## 2018-05-03 RX ADMIN — PROPOFOL 150 MG: 10 INJECTION, EMULSION INTRAVENOUS at 07:05

## 2018-05-03 RX ADMIN — ACETAMINOPHEN 1000 MG: 10 INJECTION, SOLUTION INTRAVENOUS at 09:05

## 2018-05-03 RX ADMIN — GLYCOPYRROLATE 0.4 MG: 0.2 INJECTION, SOLUTION INTRAMUSCULAR; INTRAVENOUS at 10:05

## 2018-05-03 RX ADMIN — OXYCODONE HYDROCHLORIDE 10 MG: 5 TABLET ORAL at 11:05

## 2018-05-03 RX ADMIN — ROCURONIUM BROMIDE 50 MG: 10 INJECTION, SOLUTION INTRAVENOUS at 07:05

## 2018-05-03 RX ADMIN — OXYCODONE HYDROCHLORIDE 10 MG: 5 TABLET ORAL at 04:05

## 2018-05-03 RX ADMIN — SODIUM CHLORIDE, SODIUM GLUCONATE, SODIUM ACETATE, POTASSIUM CHLORIDE, MAGNESIUM CHLORIDE, SODIUM PHOSPHATE, DIBASIC, AND POTASSIUM PHOSPHATE: .53; .5; .37; .037; .03; .012; .00082 INJECTION, SOLUTION INTRAVENOUS at 09:05

## 2018-05-03 RX ADMIN — DEXAMETHASONE SODIUM PHOSPHATE 4 MG: 4 INJECTION, SOLUTION INTRAMUSCULAR; INTRAVENOUS at 09:05

## 2018-05-03 RX ADMIN — LIDOCAINE HYDROCHLORIDE 100 MG: 20 INJECTION, SOLUTION INTRAVENOUS at 07:05

## 2018-05-03 RX ADMIN — MUPIROCIN 1 G: 20 OINTMENT TOPICAL at 11:05

## 2018-05-03 RX ADMIN — FENTANYL CITRATE 50 MCG: 50 INJECTION, SOLUTION INTRAMUSCULAR; INTRAVENOUS at 10:05

## 2018-05-03 RX ADMIN — NEOSTIGMINE METHYLSULFATE 3 MG: 1 INJECTION INTRAVENOUS at 10:05

## 2018-05-03 RX ADMIN — STANDARDIZED SENNA CONCENTRATE AND DOCUSATE SODIUM 1 TABLET: 8.6; 5 TABLET, FILM COATED ORAL at 11:05

## 2018-05-03 RX ADMIN — SODIUM CHLORIDE, SODIUM LACTATE, POTASSIUM CHLORIDE, AND CALCIUM CHLORIDE: .6; .31; .03; .02 INJECTION, SOLUTION INTRAVENOUS at 11:05

## 2018-05-03 NOTE — PROGRESS NOTES
[facility]  Gynecologic Oncology  Progress Note      Patient Name: Michelle Rivers  MRN: [id number]  Admission Date: 5/3/2018  Hospital Length of Stay: 0 days  Attending Provider: Kaitlin Pinto MD  Primary Care Provider: Serenity Aaron MD  Principal Problem: <principal problem not specified>    Follow-up For: Procedure(s) (LRB):  HYSTERECTOMY-ABDOMINAL-TOTAL (ROMAIN) (N/A)  MEVXELZD-JHLOLZWHMLLK-KAMHLTNOR (BSO) (Bilateral)  RESECTION-MASS (N/A)  Post-Operative Day: Day of Surgery  Subjective:      History of Present Illness:  Michelle Rivers is a 53 y.o. here for scheduled ROMAIN.     Per last clinic visit:  HPI  Referred from Dr. Norma Saunders in [place] for pelvic mass. P1. Primary complaint is diarrhea. And weight loss.       Imaging reviewed.  CT 2/27/18 14.8cm predominately cystic pelvic mass with questionable solid component likely arising from the left ovary. No adenopathy or ascites.   US 2/28/18 16.2cm complex cystic pelvic mass, 6.3cm solid mass, unable to delineate uterus and ovaries      Labs reviewed:   normal per notes.   hgb 14.7  Cr 0.75     Medical history significant for HIV/Hep C, HTN, tobacco use.      Prior abdominal surgeries include c/s x 1.      Colonoscopy scheduled for 3/12/18.      MMG per patient normal a couple months ago.      Denies family history of breast, colon, uterine, or ovarian cancer.     Presents today for follow up. Colonoscopy 3/2018 normal per patient. CEA 5.5   CT 3/19/18  Impression   Complex heterogeneous pelvic mass consistent with history of ovarian neoplasm.  Mild distention of the bilateral renal collecting systems likely due to mass effect from a pelvic mass.       Hospital Course:  05/03/2018: To OR for planned procedures: ROMAIN/BSO,fibroid uterus. Uncomplicated procedure.  cc. Doing well post-operatively. Tolerating sips. Still on IVF at 125. Pain controlled. Sahu in place draining clear yellow urine.    Interval History:   POD #0  s/pTAH/BSO, fibroid uterus. Uncomplicated procedure.  cc. Doing well post-operatively. Tolerating sips. Still on IVF at 125. Pain controlled. Sahu in place draining clear yellow urine.    Scheduled Meds:   acetaminophen  1,000 mg Intravenous Q8H    ibuprofen  400 mg Intravenous Q6H    mupirocin  1 g Nasal BID    nicotine  1 patch Transdermal Daily    pantoprazole 40 mg in dextrose 5 % 100 mL infusion (ready to mix system)  40 mg Intravenous Daily    senna-docusate 8.6-50 mg  1 tablet Oral BID     Continuous Infusions:   lactated ringers 100 mL/hr at 05/03/18 1105     PRN Meds:albuterol-ipratropium 2.5mg-0.5mg/3mL, hydrALAZINE, HYDROmorphone, metoclopramide HCl, ondansetron, oxyCODONE, oxyCODONE, traZODone    Review of patient's allergies indicates:  No Known Allergies    Objective:     Vital Signs (Most Recent):  Temp: 97.3 °F (36.3 °C) (05/03/18 1029)  Pulse: 72 (05/03/18 1045)  Resp: 15 (05/03/18 1045)  BP: 125/65 (05/03/18 1045)  SpO2: 95 % (05/03/18 1045) Vital Signs (24h Range):  Temp:  [97.3 °F (36.3 °C)-97.8 °F (36.6 °C)] 97.3 °F (36.3 °C)  Pulse:  [55-84] 72  Resp:  [15-18] 15  SpO2:  [94 %-95 %] 95 %  BP: (101-128)/(65-77) 125/65     Weight: 57.2 kg (126 lb)  Body mass index is 25.45 kg/m².    Intake/Output - Last 3 Shifts       05/01 0700 - 05/02 0659 05/02 0700 - 05/03 0659 05/03 0700 - 05/04 0659    I.V. (mL/kg)   2000 (35)    Total Intake(mL/kg)   2000 (35)    Urine (mL/kg/hr)   125 (0.5)    Blood   250 (1.1)    Total Output     375    Net     +1625                  Physical Exam:   Constitutional: She is oriented to person, place, and time. She appears well-developed and well-nourished.    HENT:   Head: Normocephalic and atraumatic.    Eyes: Conjunctivae and EOM are normal. Pupils are equal, round, and reactive to light.    Neck: Normal range of motion. Neck supple.    Cardiovascular: Normal rate, regular rhythm and normal heart sounds.     Pulmonary/Chest: Effort normal and breath  sounds normal. No respiratory distress.        Abdominal: Soft. Bowel sounds are normal. She exhibits abdominal incision. She exhibits no distension. There is tenderness (mild, appropriate). There is no rebound and no guarding.   Midline incision is bandaged with steri strips in place. No excessive drainage. Soft, non-distended. Quiet bowel sounds.              Musculoskeletal: Normal range of motion.       Neurological: She is alert and oriented to person, place, and time.    Skin: Skin is warm and dry. No rash noted.    Psychiatric: She has a normal mood and affect. Her behavior is normal. Judgment and thought content normal.       Lines/Drains/Airways     Drain                 Urethral Catheter 05/03/18 0728 Straight-tip;Non-latex 16 Fr. less than 1 day          Peripheral Intravenous Line                 Peripheral IV - Single Lumen Left Hand -- days         Peripheral IV - Single Lumen 05/03/18 0552 Right Hand less than 1 day              Laboratory:  Pre CBC: 4.4/15.2/47.7/180    Diagnostic Results:  Pathology pending, appeared benign/fibroids intraoperatively.    Assessment/Plan:     S/P ROMAIN-BSO 5/3/2018    - Procedure complications: NA  - EBL: 250 cc  - IVF @ 125 cc/h; d/c when tolerating PO  - Diet: CLD, ADAT to regular  - Pain control: IV ibuprofen/tylenol, oxyIR 5/10 mg, 0.5 mg dilaudid BTP  - In/Out: Sahu in place draining clear yellow urine; d/c Sahu in AM POD #1  - Bowel function: -flatus/-BM, as expected POD #0  - PRN: simethicone, zofran, phenergan, benadryl  - Heme: Preop CBC 4.4/15.2/47.7/180; AM CBC, CMP ordered  - PPX: ambulation encouraged, IS encouraged, ppx lovenox not indicated, TEDs/SCDs in place    Disposition: Will plan to evaluate the patient as she meets her post-operative milestones. Will tentatively plan for discharge to home POD #2.        Depression    - no current meds        Acid reflux    - daily protonix        Hepatitis C- Type 2 B    - no known cirrhosis        HIV (human  immunodeficiency virus infection)    History of Intra venous drug abuse about 20 years ago   Stopped IVDU 20 years ago  Smoked crack cocaine until 4 years ago  Diagnosed about 20 years ago   Follows with infectious disease at [place] , Last seen about 3 months ago   Not on HIV treatment, never was on treatment   To her understanding she did not need HIV treatment   No history of Tuberculosis, Thrush or Proneness to Infection   Unaware of CD4 count         Tobacco abuse    - nicotine patch PRN        Essential hypertension    - BP: (101-133)/(65-79) 133/70   - holding lisinopril 20 mg perioperatively         Pelvic mass    - appeared benign intra-opereatively  - possible degenerating fibroids          VTE Risk Mitigation     None          Was ibarra catheter removed? No: d/c POD #1    Shabnam Jasmine MD  Gynecologic Oncology  [facility]

## 2018-05-03 NOTE — NURSING
Received pt fro PACU, pt has midline abd dressing that has some bloody drainage noted. MD was notified, MD oncgylatasha said this was to be expected and dressing could be changed if was saturated. VSS for pt. Pt is now resting comfortably. Pt is receiving p.o. Pain medications as ordered. See MAR for details. Bedside table and call light is within reach. WCTM.

## 2018-05-03 NOTE — H&P
[facility]  Gynecologic Oncology  H&P    Patient Name: Michelle Rivers  MRN: [id number]  Admission Date: 5/3/2018  Primary Care Provider: Serenity Aaron MD   Principal Problem: <principal problem not specified>    Subjective:     Chief Complaint/Reason for Admission:     History of Present Illness:  Michelle Rivers is a 53 y.o. here for scheduled ROMAIN.     Per last clinic visit:  HPI  Referred from Dr. Norma Saunders in [place] for pelvic mass. P1. Primary complaint is diarrhea. And weight loss.       Imaging reviewed.  CT 18 14.8cm predominately cystic pelvic mass with questionable solid component likely arising from the left ovary. No adenopathy or ascites.   US 18 16.2cm complex cystic pelvic mass, 6.3cm solid mass, unable to delineate uterus and ovaries      Labs reviewed:   normal per notes.   hgb 14.7  Cr 0.75     Medical history significant for HIV/Hep C, HTN, tobacco use.      Prior abdominal surgeries include c/s x 1.      Colonoscopy scheduled for 3/12/18.      MMG per patient normal a couple months ago.      Denies family history of breast, colon, uterine, or ovarian cancer.     Presents today for follow up. Colonoscopy 3/2018 normal per patient. CEA 5.5   CT 3/19/18  Impression   Complex heterogeneous pelvic mass consistent with history of ovarian neoplasm.  Mild distention of the bilateral renal collecting systems likely due to mass effect from a pelvic mass.       Hospital Course:  2018: To OR for planned procedures.     Oncology Treatment Plan:   [No treatment plan]      Past Medical History:   Diagnosis Date    GERD (gastroesophageal reflux disease)     HIV infection     Hypertension     Overdose of illicit drug      Past Surgical History:   Procedure Laterality Date    BREAST SURGERY      Left side -      SECTION, CLASSIC      once    FRACTURE SURGERY Left     left arm surgery     Family History     None        Social History Main  Topics    Smoking status: Current Every Day Smoker     Packs/day: 1.00     Types: Cigarettes    Smokeless tobacco: Never Used    Alcohol use Yes      Comment: beer , 2 beers every other day     Drug use: Yes      Comment: crack    Sexual activity: Not on file       PTA Medications   Medication Sig    lisinopril (PRINIVIL,ZESTRIL) 20 MG tablet Take 20 mg by mouth every morning.     traZODone (DESYREL) 150 MG tablet Take 150 mg by mouth nightly as needed for Insomnia.     albuterol 90 mcg/actuation inhaler Inhale 2 puffs into the lungs every 4 (four) hours as needed for Wheezing.       Review of patient's allergies indicates:  No Known Allergies    Review of Systems   Constitutional: Negative for appetite change, chills, fatigue and fever.   HENT: Negative for mouth sores.    Respiratory: Negative for cough and shortness of breath.    Cardiovascular: Negative for leg swelling.   Gastrointestinal: Negative for abdominal pain, blood in stool, constipation and diarrhea.   Endocrine: Negative for cold intolerance.   Genitourinary: Negative for dysuria and vaginal bleeding.   Musculoskeletal: Negative for myalgias.   Skin: Negative for rash.   Allergic/Immunologic: Negative.    Neurological: Negative for weakness and numbness.   Hematological: Negative for adenopathy. Does not bruise/bleed easily.   Psychiatric/Behavioral: Negative for confusion.   Objective:     Vital Signs (Most Recent):  Temp: 97.8 °F (36.6 °C) (05/03/18 0546)  Pulse: (!) 55 (05/03/18 0546)  Resp: 18 (05/03/18 0546)  BP: 101/68 (05/03/18 0546)  SpO2: 95 % (05/03/18 0546) Vital Signs (24h Range):  Temp:  [97.8 °F (36.6 °C)] 97.8 °F (36.6 °C)  Pulse:  [55] 55  Resp:  [18] 18  SpO2:  [95 %] 95 %  BP: (101)/(68) 101/68     Weight: 57.2 kg (126 lb)  Body mass index is 25.45 kg/m².    Physical Exam  Constitutional: She is oriented to person, place, and time. She appears well-developed and well-nourished.    HENT:   Head: Normocephalic and atraumatic.     Eyes: EOM are normal. Pupils are equal, round, and reactive to light.    Neck: Normal range of motion. Neck supple. No thyromegaly present.    Cardiovascular: Normal rate, regular rhythm and intact distal pulses.     Pulmonary/Chest: Effort normal and breath sounds normal. No respiratory distress. She has no wheezes.      Abdominal: Soft. Bowel sounds are normal. She exhibits no distension, no ascites and no mass. There is no tenderness.             Musculoskeletal: Normal range of motion and moves all extremeties.      Lymphadenopathy:     She has no cervical adenopathy.        Right: No supraclavicular adenopathy present.        Left: No supraclavicular adenopathy present.    Neurological: She is alert and oriented to person, place, and time.    Skin: Skin is warm and dry. No rash noted.    Psychiatric: She has a normal mood and affect.       Assessment/Plan:     Pelvic mass: To OR for scheduled procedure.    Depression    - no current meds        Acid reflux    - daily protonix        Hepatitis C- Type 2 B    - no known cirrhosis        HIV (human immunodeficiency virus infection)    History of Intra venous drug abuse about 20 years ago   Stopped IVDU 20 years ago  Smoked crack cocaine until 4 years ago  Diagnosed about 20 years ago   Follows with infectious disease at [place] , Last seen about 3 months ago   Not on HIV treatment, never was on treatment   To her understanding she did not need HIV treatment   No history of Tuberculosis, Thrush or Proneness to Infection   Unaware of CD4 count         Tobacco abuse    - nicotine patch PRN        Essential hypertension    - holding lisinopril 20 mg perioperatively                Addis Guevara MD  Gynecologic Oncology  [facility]

## 2018-05-03 NOTE — NURSING TRANSFER
Nursing Transfer Note      5/3/2018     Transfer To: 1009 From PACU    Transfer via stretcher    Transfer with IV pump    Transported by PCT    Medicines sent: none    Chart send with patient: Yes    Notified: aunt    Patient reassessed at: 5/3/18 @ 1410     Upon arrival to floor:

## 2018-05-03 NOTE — OP NOTE
DATE OF PROCEDURE:  5/3/18     SURGEON:  Kaitlin Pinto M.D.     ASSISTANTS:  Addis Guevara MD (RES) and Shabnam Jasmine MD (RES)      PREOPERATIVE DIAGNOSES:  1. Pelvic mass  2. Elevated      POSTOPERATIVE DIAGNOSES:  1. Pelvic mass  2. Elevated         PROCEDURES PERFORMED: Extensive total abdominal hysterectomy, mass resection, bilateral salpingo-oophorectomy     ANESTHESIA:  General endotracheal anesthesia.     SPECIMENS REMOVED:  1.  Uterus   2.  Cervix  3.  R fallopian tube and ovary  4. L fallopian tube and ovary/pelvic mass     ESTIMATED BLOOD LOSS:  400 mL.     COMPLICATIONS:  None.     FINDINGS:   1. Bulge in the posterior cul de sac with cervix anterior behind the pubic bone, palpable hard 10cm mass RLQ, cystic 20cm mass LLQ  2. Omental adhesion to the anterior abdominal wall  3. Anterior peritoneum, bladder adherent to the uterus and anterior pelvic wall  4. 10cm uterus pushed to the right with large 20cystic left ovarian mass adherent to the uterus, bladder, bowel, side wall. Right fallopian tube and ovary normal in appearance with small posterior adhesion  5. 3cm pedunculated fundal fibroid  6. Long thin normal cervix  7. Bilateral ureters visualized and noted to vermiculate  8. Hemostasis noted at conclusion of the case  9. No evidence of intraperitoneal disease. No ascites. Visualized portions or the small and large bowel no evidence of disease. Smooth peritoneal surfaces. No obvious palpable adenopathy.        PROCEDURE IN DETAIL:  The patient was taken to the Operating Room.  Informed consent had been obtained.  She underwent general endotracheal anesthesia  without difficulty and was prepped and draped in the normal sterile fashion in the dorsal lithotomy position.  Timeout was performed.  All parties agreed to the planned procedure. Perioperative antibiotics were administered.  Sahu catheter was placed under sterile conditions.  Midline vertical skin incision was made with a  scalpel and carried down to the underlying layer of fascia. The fascia was incised in the midline and extended superiorly and inferiorly.  The rectus muscles were divided in the midline.  The peritoneum was identified, tented up with two tonsil clamps, and entered sharply with Metzenbaum scissors. The peritoneal incision was extended superiorly and inferiorly.      Bookwalter retractor was assembled and bowel was packed away with moist laparotomy sponges. We then identified the round ligament on the right, cauterized and transected it. This facilitated access to the retroperitoneum.  The retroperitoneum was then opened. The right IP ligament was then skeletonized, doubly clamped with Gage hysterectomy clamps, transected, and doubly suture ligated with good visualization of the ureter beneath.      We turned our attention anteriorly. Here the procedure became quite extensive. The vesicouterine peritoneum was tented up, proper plane for the bladder flap was identified, and the bladder was gently reflected off the anterior aspect of the uterus and cervix to the level of the cervicovaginal junction. The peritoneum was thickened and denseley adhered to the anterior uterus and large pelvic mass. We were then able to identify the left IP ligament and this was skeletonized with the ureter identified beneath the complex. It was doubly clamped with Gage hysterectomy clamps, transected, and doubly suture ligated. With further extensive sharp and blunt dissection we were then able to elevated the entire complex out of the pelvis.      The bilateral uterine arteries were then skeletonized.  These were clamped with Gage hysterectomy clamps, transected, and suture ligated.  We them amputated the specimen at the level of the cervix in order to provide more optimal visualization. The remainder of the uterosacral and cardinal ligaments were then also serially clamped, transected, and suture ligated. We then used two curved  Gage hysterectomy clamps to come across the upper vagina.  The remainder of the cervix was then released and handed off to pathology. The vaginal cuff was closed with an 0 Vicryl suture in a running locked fashion.  The abdomen and pelvis were copiously irrigated, cleared of all clot and debris, and rendered hemostatic. Ureters were reinspected and both noted to be peristalsing equally and briskly.       Laparotomy sponges were removed from the abdomen.  Bookwalter retractor was taken down.  Exparel was placed superiorly and inferiorly to the fascia.  The fascia was reapproximated with a #1 looped PDS in a running fashion.  Subcutaneous tissue was irrigated, cleared of all clot and debris, and rendered hemostatic. Subcutaneous tissue was reapproximated with vicryl suture in a running fashion. Skin incision was closed with 4-0 Monocryl in a subcuticular fashion and topped with sterile dressing. The patient tolerated the procedure well.  Sponge, lap, needle, and instrument counts were correct x2 as reported by the circulating nurse. She was awakened from anesthesia and taken to recovery in stable condition.

## 2018-05-03 NOTE — ASSESSMENT & PLAN NOTE
History of Intra venous drug abuse about 20 years ago   Stopped IVDU 20 years ago  Smoked crack cocaine until 4 years ago  Diagnosed about 20 years ago   Follows with infectious disease at [place] , Last seen about 3 months ago   Not on HIV treatment, never was on treatment   To her understanding she did not need HIV treatment   No history of Tuberculosis, Thrush or Proneness to Infection   Unaware of CD4 count

## 2018-05-03 NOTE — SUBJECTIVE & OBJECTIVE
Oncology Treatment Plan:   [No treatment plan]      Past Medical History:   Diagnosis Date    GERD (gastroesophageal reflux disease)     HIV infection     Hypertension     Overdose of illicit drug      Past Surgical History:   Procedure Laterality Date    BREAST SURGERY      Left side -      SECTION, CLASSIC      once    FRACTURE SURGERY Left     left arm surgery     Family History     None        Social History Main Topics    Smoking status: Current Every Day Smoker     Packs/day: 1.00     Types: Cigarettes    Smokeless tobacco: Never Used    Alcohol use Yes      Comment: beer , 2 beers every other day     Drug use: Yes      Comment: crack    Sexual activity: Not on file       PTA Medications   Medication Sig    lisinopril (PRINIVIL,ZESTRIL) 20 MG tablet Take 20 mg by mouth every morning.     traZODone (DESYREL) 150 MG tablet Take 150 mg by mouth nightly as needed for Insomnia.     albuterol 90 mcg/actuation inhaler Inhale 2 puffs into the lungs every 4 (four) hours as needed for Wheezing.       Review of patient's allergies indicates:  No Known Allergies    Review of Systems   Constitutional: Negative for appetite change, chills, fatigue and fever.   HENT: Negative for mouth sores.    Respiratory: Negative for cough and shortness of breath.    Cardiovascular: Negative for leg swelling.   Gastrointestinal: Negative for abdominal pain, blood in stool, constipation and diarrhea.   Endocrine: Negative for cold intolerance.   Genitourinary: Negative for dysuria and vaginal bleeding.   Musculoskeletal: Negative for myalgias.   Skin: Negative for rash.   Allergic/Immunologic: Negative.    Neurological: Negative for weakness and numbness.   Hematological: Negative for adenopathy. Does not bruise/bleed easily.   Psychiatric/Behavioral: Negative for confusion.   Objective:     Vital Signs (Most Recent):  Temp: 97.8 °F (36.6 °C) (18 0546)  Pulse: (!) 55 (18 0546)  Resp: 18 (18  0546)  BP: 101/68 (05/03/18 0546)  SpO2: 95 % (05/03/18 0546) Vital Signs (24h Range):  Temp:  [97.8 °F (36.6 °C)] 97.8 °F (36.6 °C)  Pulse:  [55] 55  Resp:  [18] 18  SpO2:  [95 %] 95 %  BP: (101)/(68) 101/68     Weight: 57.2 kg (126 lb)  Body mass index is 25.45 kg/m².    Physical Exam  Constitutional: She is oriented to person, place, and time. She appears well-developed and well-nourished.    HENT:   Head: Normocephalic and atraumatic.    Eyes: EOM are normal. Pupils are equal, round, and reactive to light.    Neck: Normal range of motion. Neck supple. No thyromegaly present.    Cardiovascular: Normal rate, regular rhythm and intact distal pulses.     Pulmonary/Chest: Effort normal and breath sounds normal. No respiratory distress. She has no wheezes.      Abdominal: Soft. Bowel sounds are normal. She exhibits no distension, no ascites and no mass. There is no tenderness.             Musculoskeletal: Normal range of motion and moves all extremeties.      Lymphadenopathy:     She has no cervical adenopathy.        Right: No supraclavicular adenopathy present.        Left: No supraclavicular adenopathy present.    Neurological: She is alert and oriented to person, place, and time.    Skin: Skin is warm and dry. No rash noted.    Psychiatric: She has a normal mood and affect.

## 2018-05-03 NOTE — ANESTHESIA POSTPROCEDURE EVALUATION
"Anesthesia Post Evaluation    Patient: Michelle Rivers    Procedure(s) Performed: Procedure(s) (LRB):  HYSTERECTOMY-ABDOMINAL-TOTAL (ROMAIN) (N/A)  [id number]-[id number]-[id number] (BSO) (Bilateral)  RESECTION-MASS (N/A)    Final Anesthesia Type: general  Patient location during evaluation: PACU  Patient participation: Yes- Able to Participate  Level of consciousness: awake and alert and oriented  Post-procedure vital signs: reviewed and stable  Pain management: adequate  Airway patency: patent  PONV status at discharge: No PONV  Anesthetic complications: no      Cardiovascular status: hemodynamically stable  Respiratory status: unassisted  Hydration status: euvolemic  Follow-up not needed.        Visit Vitals  /69   Pulse 74   Temp 36.4 °C (97.6 °F) (Temporal)   Resp 13   Ht 4' 11" (1.499 m)   Wt 57.2 kg (126 lb)   SpO2 95%   BMI 25.45 kg/m²       Pain/Abraham Score: Pain Assessment Performed: Yes (5/3/2018  3:15 PM)  Presence of Pain: complains of pain/discomfort (pt states that pain is tolerable) (5/3/2018  3:15 PM)  Pain Rating Prior to Med Admin: 7 (5/3/2018 11:35 AM)  Pain Rating Post Med Admin: 3 (5/3/2018  3:15 PM)  Abraham Score: 10 (5/3/2018  3:15 PM)      "

## 2018-05-03 NOTE — BRIEF OP NOTE
[facility]  Brief Operative Note    SUMMARY     Surgery Date: 5/3/2018     Surgeon(s) and Role:     * Addis Guevara MD - Resident - Assisting     * Kaitlin Pinto MD - Primary    Pre-op Diagnosis:    1. Pelvic mass [R19.00]    Post-op Diagnosis:   Same  2. Adhesive disease    Procedure(s) (LRB):  HYSTERECTOMY-ABDOMINAL-TOTAL (ROMAIN) (N/A)  [id number] (BSO) (Bilateral)  RESECTION-MASS (N/A)    Anesthesia: General    Description of the findings of the procedure:   1. Bulge in the posterior cul de sac with cervix straight up behind the pubic bone, palpable hard 10cm mass RLQ, cystic 14cm mass LLQ  2. Omental adhesion to the anterior abdominal wall  3. Anterior peritoneum, bladder adherent to the uterus and anterior pelvic wall  4. 10cm uterus pushed to the right with cystic left ovarian mass adherent to the uterus, bladder, bowel, side wall. Right fallopian tube and ovary normal in appearance with small posterior adhesion  5. 3cm pedunculated fundal fibroid  6. Long thin cervix  7. Bilateral ureters visualized and noted to vermiculate  8. Hemostasis noted at conclusion of the case    Estimated Blood Loss: 250 mL         Specimens:   Specimen (12h ago through future)    Start     Ordered    05/03/18 0908  Specimen to Pathology - Surgery  Once     Comments:  1. Uterus, right tube and ovary, and left tube and ovary - Permanent2. Cervix - Permanent      05/03/18 0947        Addis Guevara MD  PGY-3 OB/GYN  [phone]

## 2018-05-03 NOTE — ASSESSMENT & PLAN NOTE
- Procedure complications: NA  - EBL: 250 cc  - IVF @ 125 cc/h; d/c when tolerating PO  - Diet: CLD, ADAT to regular  - Pain control: IV ibuprofen/tylenol, oxyIR 5/10 mg, 0.5 mg dilaudid BTP  - In/Out: Sahu in place draining clear yellow urine; d/c Sahu in AM POD #1  - Bowel function: -flatus/-BM, as expected POD #0  - PRN: simethicone, zofran, phenergan, benadryl  - Heme: Preop CBC 4.4/15.2/47.7/180; AM CBC, CMP ordered  - PPX: ambulation encouraged, IS encouraged, ppx lovenox not indicated, TEDs/SCDs in place    Disposition: Will plan to evaluate the patient as she meets her post-operative milestones. Will tentatively plan for discharge to home POD #2.

## 2018-05-03 NOTE — SUBJECTIVE & OBJECTIVE
Interval History:   POD #0 s/pTAH/BSO, fibroid uterus. Uncomplicated procedure.  cc. Doing well post-operatively. Tolerating sips. Still on IVF at 125. Pain controlled. Sahu in place draining clear yellow urine.    Scheduled Meds:   acetaminophen  1,000 mg Intravenous Q8H    ibuprofen  400 mg Intravenous Q6H    mupirocin  1 g Nasal BID    nicotine  1 patch Transdermal Daily    pantoprazole 40 mg in dextrose 5 % 100 mL infusion (ready to mix system)  40 mg Intravenous Daily    senna-docusate 8.6-50 mg  1 tablet Oral BID     Continuous Infusions:   lactated ringers 100 mL/hr at 05/03/18 1105     PRN Meds:albuterol-ipratropium 2.5mg-0.5mg/3mL, hydrALAZINE, HYDROmorphone, metoclopramide HCl, ondansetron, oxyCODONE, oxyCODONE, traZODone    Review of patient's allergies indicates:  No Known Allergies    Objective:     Vital Signs (Most Recent):  Temp: 97.3 °F (36.3 °C) (05/03/18 1029)  Pulse: 72 (05/03/18 1045)  Resp: 15 (05/03/18 1045)  BP: 125/65 (05/03/18 1045)  SpO2: 95 % (05/03/18 1045) Vital Signs (24h Range):  Temp:  [97.3 °F (36.3 °C)-97.8 °F (36.6 °C)] 97.3 °F (36.3 °C)  Pulse:  [55-84] 72  Resp:  [15-18] 15  SpO2:  [94 %-95 %] 95 %  BP: (101-128)/(65-77) 125/65     Weight: 57.2 kg (126 lb)  Body mass index is 25.45 kg/m².    Intake/Output - Last 3 Shifts       05/01 0700 - 05/02 0659 05/02 0700 - 05/03 0659 05/03 0700 - 05/04 0659    I.V. (mL/kg)   2000 (35)    Total Intake(mL/kg)   2000 (35)    Urine (mL/kg/hr)   125 (0.5)    Blood   250 (1.1)    Total Output     375    Net     +1625                  Physical Exam:   Constitutional: She is oriented to person, place, and time. She appears well-developed and well-nourished.    HENT:   Head: Normocephalic and atraumatic.    Eyes: Conjunctivae and EOM are normal. Pupils are equal, round, and reactive to light.    Neck: Normal range of motion. Neck supple.    Cardiovascular: Normal rate, regular rhythm and normal heart sounds.     Pulmonary/Chest:  Effort normal and breath sounds normal. No respiratory distress.        Abdominal: Soft. Bowel sounds are normal. She exhibits abdominal incision. She exhibits no distension. There is tenderness (mild, appropriate). There is no rebound and no guarding.   Midline incision is bandaged with steri strips in place. No excessive drainage. Soft, non-distended. Quiet bowel sounds.              Musculoskeletal: Normal range of motion.       Neurological: She is alert and oriented to person, place, and time.    Skin: Skin is warm and dry. No rash noted.    Psychiatric: She has a normal mood and affect. Her behavior is normal. Judgment and thought content normal.       Lines/Drains/Airways     Drain                 Urethral Catheter 05/03/18 0728 Straight-tip;Non-latex 16 Fr. less than 1 day          Peripheral Intravenous Line                 Peripheral IV - Single Lumen Left Hand -- days         Peripheral IV - Single Lumen 05/03/18 0552 Right Hand less than 1 day              Laboratory:  Pre CBC: 4.4/15.2/47.7/180    Diagnostic Results:  Pathology pending, appeared benign/fibroids intraoperatively.

## 2018-05-03 NOTE — TRANSFER OF CARE
"Anesthesia Transfer of Care Note    Patient: Michelle Rivers    Procedure(s) Performed: Procedure(s) (LRB):  HYSTERECTOMY-ABDOMINAL-TOTAL (ROMAIN) (N/A)  [id number] (BSO) (Bilateral)  RESECTION-MASS (N/A)    Patient location: PACU    Anesthesia Type: general    Transport from OR: Transported from OR on room air with adequate spontaneous ventilation    Post pain: adequate analgesia    Post assessment: no apparent anesthetic complications and tolerated procedure well    Post vital signs: stable    Level of consciousness: awake and alert    Nausea/Vomiting: no nausea/vomiting    Complications: none    Transfer of care protocol was followed      Last vitals:   Visit Vitals  /77   Pulse 84   Temp 36.3 °C (97.3 °F) (Temporal)   Resp 17   Ht 4' 11" (1.499 m)   Wt 57.2 kg (126 lb)   SpO2 (!) 94%   BMI 25.45 kg/m²     "

## 2018-05-04 ENCOUNTER — TELEPHONE (OUTPATIENT)
Dept: GYNECOLOGIC ONCOLOGY | Facility: CLINIC | Age: 53
End: 2018-05-04

## 2018-05-04 LAB
ANION GAP SERPL CALC-SCNC: 8 MMOL/L
BASOPHILS # BLD AUTO: 0.02 K/UL
BASOPHILS NFR BLD: 0.3 %
BUN SERPL-MCNC: 8 MG/DL
CALCIUM SERPL-MCNC: 8.2 MG/DL
CHLORIDE SERPL-SCNC: 106 MMOL/L
CO2 SERPL-SCNC: 21 MMOL/L
CREAT SERPL-MCNC: 0.6 MG/DL
DIFFERENTIAL METHOD: ABNORMAL
EOSINOPHIL # BLD AUTO: 0 K/UL
EOSINOPHIL NFR BLD: 0.4 %
ERYTHROCYTE [DISTWIDTH] IN BLOOD BY AUTOMATED COUNT: 14.4 %
EST. GFR  (AFRICAN AMERICAN): >60 ML/MIN/1.73 M^2
EST. GFR  (NON AFRICAN AMERICAN): >60 ML/MIN/1.73 M^2
GLUCOSE SERPL-MCNC: 114 MG/DL
HCT VFR BLD AUTO: 33.3 %
HGB BLD-MCNC: 11 G/DL
IMM GRANULOCYTES # BLD AUTO: 0.02 K/UL
IMM GRANULOCYTES NFR BLD AUTO: 0.3 %
LYMPHOCYTES # BLD AUTO: 1.6 K/UL
LYMPHOCYTES NFR BLD: 21.9 %
MCH RBC QN AUTO: 30.7 PG
MCHC RBC AUTO-ENTMCNC: 33 G/DL
MCV RBC AUTO: 93 FL
MONOCYTES # BLD AUTO: 0.6 K/UL
MONOCYTES NFR BLD: 7.5 %
NEUTROPHILS # BLD AUTO: 5.2 K/UL
NEUTROPHILS NFR BLD: 69.6 %
NRBC BLD-RTO: 0 /100 WBC
PLATELET # BLD AUTO: 149 K/UL
PMV BLD AUTO: 10.8 FL
POTASSIUM SERPL-SCNC: 3.9 MMOL/L
RBC # BLD AUTO: 3.58 M/UL
SODIUM SERPL-SCNC: 135 MMOL/L
WBC # BLD AUTO: 7.49 K/UL

## 2018-05-04 PROCEDURE — 20600001 HC STEP DOWN PRIVATE ROOM

## 2018-05-04 PROCEDURE — 80048 BASIC METABOLIC PNL TOTAL CA: CPT

## 2018-05-04 PROCEDURE — 36415 COLL VENOUS BLD VENIPUNCTURE: CPT

## 2018-05-04 PROCEDURE — 25000003 PHARM REV CODE 250: Performed by: STUDENT IN AN ORGANIZED HEALTH CARE EDUCATION/TRAINING PROGRAM

## 2018-05-04 PROCEDURE — 85025 COMPLETE CBC W/AUTO DIFF WBC: CPT

## 2018-05-04 PROCEDURE — 63600175 PHARM REV CODE 636 W HCPCS: Performed by: STUDENT IN AN ORGANIZED HEALTH CARE EDUCATION/TRAINING PROGRAM

## 2018-05-04 PROCEDURE — C9113 INJ PANTOPRAZOLE SODIUM, VIA: HCPCS | Performed by: STUDENT IN AN ORGANIZED HEALTH CARE EDUCATION/TRAINING PROGRAM

## 2018-05-04 PROCEDURE — S4991 NICOTINE PATCH NONLEGEND: HCPCS | Performed by: STUDENT IN AN ORGANIZED HEALTH CARE EDUCATION/TRAINING PROGRAM

## 2018-05-04 PROCEDURE — 99024 POSTOP FOLLOW-UP VISIT: CPT | Mod: ,,, | Performed by: OBSTETRICS & GYNECOLOGY

## 2018-05-04 RX ORDER — IBUPROFEN 800 MG/1
800 TABLET ORAL EVERY 6 HOURS
Qty: 30 TABLET | Refills: 1 | Status: SHIPPED | OUTPATIENT
Start: 2018-05-04

## 2018-05-04 RX ORDER — OXYCODONE AND ACETAMINOPHEN 5; 325 MG/1; MG/1
1 TABLET ORAL EVERY 4 HOURS PRN
Qty: 30 TABLET | Refills: 0 | Status: SHIPPED | OUTPATIENT
Start: 2018-05-04

## 2018-05-04 RX ORDER — IBUPROFEN 400 MG/1
800 TABLET ORAL EVERY 6 HOURS PRN
Status: DISCONTINUED | OUTPATIENT
Start: 2018-05-04 | End: 2018-05-04

## 2018-05-04 RX ORDER — IBUPROFEN 400 MG/1
800 TABLET ORAL EVERY 6 HOURS
Status: DISCONTINUED | OUTPATIENT
Start: 2018-05-04 | End: 2018-05-05 | Stop reason: HOSPADM

## 2018-05-04 RX ADMIN — IBUPROFEN 800 MG: 400 TABLET, FILM COATED ORAL at 05:05

## 2018-05-04 RX ADMIN — IBUPROFEN 800 MG: 400 TABLET, FILM COATED ORAL at 12:05

## 2018-05-04 RX ADMIN — STANDARDIZED SENNA CONCENTRATE AND DOCUSATE SODIUM 1 TABLET: 8.6; 5 TABLET, FILM COATED ORAL at 08:05

## 2018-05-04 RX ADMIN — IBUPROFEN 800 MG: 400 TABLET, FILM COATED ORAL at 11:05

## 2018-05-04 RX ADMIN — SODIUM CHLORIDE, SODIUM LACTATE, POTASSIUM CHLORIDE, AND CALCIUM CHLORIDE: .6; .31; .03; .02 INJECTION, SOLUTION INTRAVENOUS at 11:05

## 2018-05-04 RX ADMIN — Medication 0.5 MG: at 11:05

## 2018-05-04 RX ADMIN — ACETAMINOPHEN 1000 MG: 10 INJECTION, SOLUTION INTRAVENOUS at 05:05

## 2018-05-04 RX ADMIN — OXYCODONE HYDROCHLORIDE 10 MG: 5 TABLET ORAL at 08:05

## 2018-05-04 RX ADMIN — SODIUM CHLORIDE, SODIUM LACTATE, POTASSIUM CHLORIDE, AND CALCIUM CHLORIDE: .6; .31; .03; .02 INJECTION, SOLUTION INTRAVENOUS at 10:05

## 2018-05-04 RX ADMIN — DEXTROSE 40 MG: 50 INJECTION, SOLUTION INTRAVENOUS at 09:05

## 2018-05-04 RX ADMIN — IBUPROFEN 800 MG: 400 TABLET, FILM COATED ORAL at 06:05

## 2018-05-04 RX ADMIN — OXYCODONE HYDROCHLORIDE 5 MG: 5 TABLET ORAL at 10:05

## 2018-05-04 RX ADMIN — Medication 0.5 MG: at 04:05

## 2018-05-04 RX ADMIN — NICOTINE 1 PATCH: 7 PATCH, EXTENDED RELEASE TRANSDERMAL at 08:05

## 2018-05-04 NOTE — PROGRESS NOTES
[facility]  Gynecologic Oncology  Progress Note      Patient Name: Michelle Rivers  MRN: [id number]  Admission Date: 5/3/2018  Hospital Length of Stay: 1 days  Attending Provider: Kaitlin Pinto MD  Primary Care Provider: Serenity Aaron MD  Principal Problem: <principal problem not specified>    Follow-up For: Procedure(s) (LRB):  HYSTERECTOMY-ABDOMINAL-TOTAL (RMOAIN) (N/A)  [id number] (BSO) (Bilateral)  RESECTION-MASS (N/A)  Post-Operative Day: 1 Day Post-Op  Subjective:      History of Present Illness:  Michelle Rivers is a 53 y.o. here for scheduled ROMAIN.     Per last clinic visit:  HPI  Referred from Dr. Norma Saunders in [place] for pelvic mass. P1. Primary complaint is diarrhea. And weight loss.       Imaging reviewed.  CT 2/27/18 14.8cm predominately cystic pelvic mass with questionable solid component likely arising from the left ovary. No adenopathy or ascites.   US 2/28/18 16.2cm complex cystic pelvic mass, 6.3cm solid mass, unable to delineate uterus and ovaries      Labs reviewed:   normal per notes.   hgb 14.7  Cr 0.75     Medical history significant for HIV/Hep C, HTN, tobacco use.      Prior abdominal surgeries include c/s x 1.      Colonoscopy scheduled for 3/12/18.      MMG per patient normal a couple months ago.      Denies family history of breast, colon, uterine, or ovarian cancer.     Presents today for follow up. Colonoscopy 3/2018 normal per patient. CEA 5.5   CT 3/19/18  Impression   Complex heterogeneous pelvic mass consistent with history of ovarian neoplasm.  Mild distention of the bilateral renal collecting systems likely due to mass effect from a pelvic mass.       Hospital Course:  05/03/2018: To OR for planned procedures: ROMAIN/BSO,fibroid uterus. Uncomplicated procedure.  cc. Doing well post-operatively. Tolerating sips. Still on IVF at 125. Pain controlled. Sahu in place draining clear yellow urine.  05/04/2018 POD #1 s/p  X-lap/ROMAIN/BSO. Doing well. Pain well controlled overnight. Tolerated some small snacks. Sahu in place draining clear urine with adequate output. Has not yet ambulated. -flatus/-BM.     Interval History:   POD #1 s/p X-lap/ROMAIN/BSO. Doing well. Pain well controlled overnight. Tolerated some small snacks. Sahu in place draining clear urine with adequate output. Has not yet ambulated. -flatus/-BM.     Scheduled Meds:   acetaminophen  1,000 mg Intravenous Q8H    ibuprofen  400 mg Intravenous Q6H    mupirocin  1 g Nasal BID    nicotine  1 patch Transdermal Daily    pantoprazole 40 mg in dextrose 5 % 100 mL infusion (ready to mix system)  40 mg Intravenous Daily    senna-docusate 8.6-50 mg  1 tablet Oral BID     Continuous Infusions:   lactated ringers 100 mL/hr at 05/03/18 2301     PRN Meds:albuterol-ipratropium 2.5mg-0.5mg/3mL, hydrALAZINE, HYDROmorphone, metoclopramide HCl, ondansetron, oxyCODONE, oxyCODONE, traZODone    Review of patient's allergies indicates:  No Known Allergies    Objective:     Vital Signs (Most Recent):  Temp: 98.4 °F (36.9 °C) (05/04/18 0433)  Pulse: 65 (05/04/18 0433)  Resp: 16 (05/04/18 0433)  BP: 106/65 (05/04/18 0433)  SpO2: (!) 94 % (05/04/18 0433) Vital Signs (24h Range):  Temp:  [97.3 °F (36.3 °C)-98.6 °F (37 °C)] 98.4 °F (36.9 °C)  Pulse:  [55-84] 65  Resp:  [10-18] 16  SpO2:  [90 %-97 %] 94 %  BP: ()/(60-86) 106/65     Weight: 57.2 kg (126 lb)  Body mass index is 25.45 kg/m².    Intake/Output - Last 3 Shifts       05/02 0700 - 05/03 0659 05/03 0700 - 05/04 0659    I.V. (mL/kg)  2425 (42.4)    IV Piggyback  500    Total Intake(mL/kg)  2925 (51.1)    Urine (mL/kg/hr)  825 (0.6)    Blood  250 (0.2)    Total Output   1075    Net   +1850                   Physical Exam:   Constitutional: She is oriented to person, place, and time. She appears well-developed and well-nourished.    HENT:   Head: Normocephalic and atraumatic.    Eyes: Conjunctivae and EOM are normal. Pupils are  equal, round, and reactive to light.    Neck: Normal range of motion. Neck supple.    Cardiovascular: Normal rate, regular rhythm and normal heart sounds.     Pulmonary/Chest: Effort normal and breath sounds normal. No respiratory distress.        Abdominal: Soft. Bowel sounds are normal. She exhibits abdominal incision. She exhibits no distension. There is tenderness (mild, appropriate). There is no rebound and no guarding.   Midline incision is bandaged with steri strips in place. No excessive drainage. Soft, non-distended. Bowel sounds hypoactive but present throughout.              Musculoskeletal: Normal range of motion and moves all extremeties.       Neurological: She is alert and oriented to person, place, and time.    Skin: Skin is warm and dry. No rash noted.    Psychiatric: She has a normal mood and affect. Her behavior is normal. Judgment and thought content normal.     Lines/Drains/Airways     Drain                 Urethral Catheter 05/03/18 0728 Straight-tip;Non-latex 16 Fr. less than 1 day          Peripheral Intravenous Line                 Peripheral IV - Single Lumen Left Hand -- days         Peripheral IV - Single Lumen 05/03/18 0552 Right Hand less than 1 day              Laboratory:  Pre CBC: 4.4/15.2/47.7/180  Awaiting AM CBC, BMP    Diagnostic Results:  Pathology pending    Assessment/Plan:     S/P [facility]-BSO 5/3/2018    - Procedure complications: NA  - EBL: 250 cc  - IVF @ 125 cc/h; d/c when tolerating PO  - Diet: CLD, ADAT to regular  - Pain control: percocet, ibuprofen 800 mg, 0.5 mg dilaudid BTP  - In/Out: Sahu in place draining clear yellow urine; d/c Sahu in AM POD #1   - UOP 1050 cc/12h = 1.53  - Bowel function: -flatus/-BM, as expected POD #0  - PRN: simethicone, zofran, phenergan, benadryl  - Heme: Preop CBC 4.4/15.2/47.7/180; AM CBC, CMP pending  - PPX: ambulation encouraged, IS encouraged, ppx lovenox not indicated, TEDs/SCDs in place    Disposition: Will plan to evaluate the patient  as she meets her post-operative milestones. Will tentatively plan for discharge to home POD #2.        Depression    - no current meds        Acid reflux    - daily protonix        Hepatitis C- Type 2 B    - no known cirrhosis        HIV (human immunodeficiency virus infection)    History of Intra venous drug abuse about 20 years ago   Stopped IVDU 20 years ago  Smoked crack cocaine until 4 years ago  Diagnosed about 20 years ago   Follows with infectious disease at [place] , Last seen about 3 months ago   Not on HIV treatment, never was on treatment   To her understanding she did not need HIV treatment   No history of Tuberculosis, Thrush or Proneness to Infection   Unaware of CD4 count         Tobacco abuse    - nicotine patch PRN        Essential hypertension    - BP: ()/(60-86) 106/65   - holding lisinopril 20 mg perioperatively         Pelvic mass    - appeared benign intra-opereatively  - possible degenerating fibroids          VTE Risk Mitigation     None          Was ibarra catheter removed? No: ordered to be removed today    Shabnam Jasmine MD  Gynecologic Oncology  [facility]

## 2018-05-04 NOTE — TELEPHONE ENCOUNTER
----- Message from Sheila Knowles RN sent at 5/4/2018 10:11 AM CDT -----  Contact: Sheila Woodruff,    Ms Granados will d/c this weekend.  She will need a 4 week post op f/u with Dr iPnto.  Please contact contact her to schedule.    Thanks,    Sheila Knowles RN, CM  Ext [phone]

## 2018-05-04 NOTE — SUBJECTIVE & OBJECTIVE
Interval History:   POD #2. Doing well. Overnight, patient pulse ox reading in the high 80s when sleeping. RN called requesting 2L NC. Patient O2 saturation improved. She had no complaint at the time: no SOB, no HA, no lightheadedness/dizziness. This AM in low to mid 90s on RA. Reports adequate pain control. BP starting to return to baseline: ok to restart HTN meds on discharge. Patient medically stable and ready for discharge to home today having met all her postop milestones. Tolerating PO, pain well controlled, ambulating, voiding, passing flatus. Routine follow up in clinic with Dr. Pinto.    Scheduled Meds:   ibuprofen  800 mg Oral Q6H    mupirocin  1 g Nasal BID    nicotine  1 patch Transdermal Daily    pantoprazole 40 mg in dextrose 5 % 100 mL infusion (ready to mix system)  40 mg Intravenous Daily    senna-docusate 8.6-50 mg  1 tablet Oral BID     Continuous Infusions:   lactated ringers 100 mL/hr at 05/04/18 1137     PRN Meds:albuterol-ipratropium 2.5mg-0.5mg/3mL, hydrALAZINE, HYDROmorphone, metoclopramide HCl, ondansetron, oxyCODONE, oxyCODONE, traZODone    Review of patient's allergies indicates:  No Known Allergies    Objective:     Vital Signs (Most Recent):  Temp: 98.2 °F (36.8 °C) (05/04/18 1220)  Pulse: 89 (05/04/18 1220)  Resp: 18 (05/04/18 1220)  BP: 105/72 (05/04/18 1220)  SpO2: 96 % (05/04/18 1220) Vital Signs (24h Range):  Temp:  [97.6 °F (36.4 °C)-98.6 °F (37 °C)] 98.2 °F (36.8 °C)  Pulse:  [64-89] 89  Resp:  [13-18] 18  SpO2:  [90 %-96 %] 96 %  BP: ()/(60-86) 105/72     Weight: 57.2 kg (126 lb)  Body mass index is 25.45 kg/m².    Intake/Output - Last 3 Shifts       05/02 0700 - 05/03 0659 05/03 0700 - 05/04 0659 05/04 0700 - 05/05 0659    I.V. (mL/kg)  3933.3 (68.8)     IV Piggyback  600     Total Intake(mL/kg)  4533.3 (79.3)     Urine (mL/kg/hr)  1975 (1.4)     Blood  250 (0.2)     Total Output   2225      Net   +2308.3             Urine Occurrence   1 x             Physical Exam:    Constitutional: She is oriented to person, place, and time. She appears well-developed and well-nourished.    HENT:   Head: Normocephalic and atraumatic.    Eyes: Conjunctivae and EOM are normal. Pupils are equal, round, and reactive to light.    Neck: Normal range of motion. Neck supple.    Cardiovascular: Normal rate, regular rhythm and normal heart sounds.     Pulmonary/Chest: Effort normal and breath sounds normal. No respiratory distress.        Abdominal: Soft. Bowel sounds are normal. She exhibits abdominal incision. She exhibits no distension. There is tenderness (mild, appropriate). There is no rebound and no guarding.   Midline incision is c/d/i with steri strips in place. No excessive drainage. Soft, non-distended. Bowel sounds normal             Musculoskeletal: Normal range of motion and moves all extremeties.       Neurological: She is alert and oriented to person, place, and time.    Skin: Skin is warm and dry. No rash noted.    Psychiatric: She has a normal mood and affect. Her behavior is normal. Judgment and thought content normal.       Lines/Drains/Airways     Peripheral Intravenous Line                 Peripheral IV - Single Lumen Left Hand -- days         Peripheral IV - Single Lumen 05/03/18 0552 Right Hand 1 day              Laboratory:    Recent Labs  Lab 05/03/18  1026 05/04/18  0514   WBC 5.84 7.49   HGB 13.3 11.0*   HCT 41.3 33.3*   MCV 96 93    149*        Recent Labs  Lab 05/04/18  0436   *   K 3.9      CO2 21*   BUN 8   CREATININE 0.6   *     Diagnostic Results:  Pathology pending

## 2018-05-04 NOTE — PROGRESS NOTES
[facility]  Gynecologic Oncology  Progress Note      Patient Name: Michelle Rivers  MRN: [id number]  Admission Date: 5/3/2018  Hospital Length of Stay: 1 days  Attending Provider: Kaitlin Pinto MD  Primary Care Provider: Serenity Aaron MD  Principal Problem: <principal problem not specified>    Follow-up For: Procedure(s) (LRB):  HYSTERECTOMY-ABDOMINAL-TOTAL (ROMAIN) (N/A)  UKPAXZSG-KANIFJCMWQFC-IZFXUXCSU (BSO) (Bilateral)  RESECTION-MASS (N/A)  Post-Operative Day: 1 Day Post-Op  Subjective:      History of Present Illness:  Michelle Rivers is a 53 y.o. here for scheduled ROMAIN.     Per last clinic visit:  HPI  Referred from Dr. Norma Saunders in [place] for pelvic mass. P1. Primary complaint is diarrhea. And weight loss.       Imaging reviewed.  CT 2/27/18 14.8cm predominately cystic pelvic mass with questionable solid component likely arising from the left ovary. No adenopathy or ascites.   US 2/28/18 16.2cm complex cystic pelvic mass, 6.3cm solid mass, unable to delineate uterus and ovaries      Labs reviewed:   normal per notes.   hgb 14.7  Cr 0.75     Medical history significant for HIV/Hep C, HTN, tobacco use.      Prior abdominal surgeries include c/s x 1.      Colonoscopy scheduled for 3/12/18.      MMG per patient normal a couple months ago.      Denies family history of breast, colon, uterine, or ovarian cancer.     Presents today for follow up. Colonoscopy 3/2018 normal per patient. CEA 5.5   CT 3/19/18  Impression   Complex heterogeneous pelvic mass consistent with history of ovarian neoplasm.  Mild distention of the bilateral renal collecting systems likely due to mass effect from a pelvic mass.       Hospital Course:  05/03/2018: To OR for planned procedures: ROMAIN/BSO,fibroid uterus. Uncomplicated procedure.  cc. Doing well post-operatively. Tolerating sips. Still on IVF at 125. Pain controlled. Sahu in place draining clear yellow urine.  05/04/2018 POD #1 s/p  X-lap/ROMAIN/BSO. Doing well. Pain well controlled overnight. Tolerating PO. Voiding well s/p Sahu. Ambulating in the room. -flatus/-BM.     Interval History:   POD #1 s/p X-lap/ROMAIN/BSO. Doing well. Pain well controlled overnight. Tolerating PO. Voiding well s/p Sahu. Ambulating in the room. -flatus/-BM.     Scheduled Meds:   ibuprofen  800 mg Oral Q6H    mupirocin  1 g Nasal BID    nicotine  1 patch Transdermal Daily    pantoprazole 40 mg in dextrose 5 % 100 mL infusion (ready to mix system)  40 mg Intravenous Daily    senna-docusate 8.6-50 mg  1 tablet Oral BID     Continuous Infusions:   lactated ringers 100 mL/hr at 05/04/18 1137     PRN Meds:albuterol-ipratropium 2.5mg-0.5mg/3mL, hydrALAZINE, HYDROmorphone, metoclopramide HCl, ondansetron, oxyCODONE, oxyCODONE, traZODone    Review of patient's allergies indicates:  No Known Allergies    Objective:     Vital Signs (Most Recent):  Temp: 98.2 °F (36.8 °C) (05/04/18 1220)  Pulse: 89 (05/04/18 1220)  Resp: 18 (05/04/18 1220)  BP: 105/72 (05/04/18 1220)  SpO2: 96 % (05/04/18 1220) Vital Signs (24h Range):  Temp:  [97.6 °F (36.4 °C)-98.6 °F (37 °C)] 98.2 °F (36.8 °C)  Pulse:  [64-89] 89  Resp:  [11-18] 18  SpO2:  [90 %-96 %] 96 %  BP: ()/(60-86) 105/72     Weight: 57.2 kg (126 lb)  Body mass index is 25.45 kg/m².    Intake/Output - Last 3 Shifts       05/02 0700 - 05/03 0659 05/03 0700 - 05/04 0659 05/04 0700 - 05/05 0659    I.V. (mL/kg)  3933.3 (68.8)     IV Piggyback  600     Total Intake(mL/kg)  4533.3 (79.3)     Urine (mL/kg/hr)  1975 (1.4)     Blood  250 (0.2)     Total Output   2225      Net   +2308.3             Urine Occurrence   1 x           Physical Exam:   Constitutional: She is oriented to person, place, and time. She appears well-developed and well-nourished.    HENT:   Head: Normocephalic and atraumatic.    Eyes: Conjunctivae and EOM are normal. Pupils are equal, round, and reactive to light.    Neck: Normal range of motion. Neck supple.     Cardiovascular: Normal rate, regular rhythm and normal heart sounds.     Pulmonary/Chest: Effort normal and breath sounds normal. No respiratory distress.        Abdominal: Soft. Bowel sounds are normal. She exhibits abdominal incision. She exhibits no distension. There is tenderness (mild, appropriate). There is no rebound and no guarding.   Midline incision is bandaged with steri strips in place. No excessive drainage. Soft, non-distended. Bowel sounds hypoactive but present throughout.              Musculoskeletal: Normal range of motion and moves all extremeties.       Neurological: She is alert and oriented to person, place, and time.    Skin: Skin is warm and dry. No rash noted.    Psychiatric: She has a normal mood and affect. Her behavior is normal. Judgment and thought content normal.     Lines/Drains/Airways     Peripheral Intravenous Line                 Peripheral IV - Single Lumen Left Hand -- days         Peripheral IV - Single Lumen 05/03/18 0552 Right Hand 1 day              Laboratory:    Recent Labs  Lab 05/03/18  1026 05/04/18  0514   WBC 5.84 7.49   HGB 13.3 11.0*   HCT 41.3 33.3*   MCV 96 93    149*        Recent Labs  Lab 05/04/18  0436   *   K 3.9      CO2 21*   BUN 8   CREATININE 0.6   *     Diagnostic Results:  Pathology pending    Assessment/Plan:     S/P ROMAIN-BSO 5/3/2018    - Procedure complications: NA  - EBL: 250 cc  - Diet: regular  - Pain control: percocet, ibuprofen 800 mg, 0.5 mg dilaudid BTP  - In/Out: voiding well s/p Sahu   - Bowel function: -flatus/-BM, encouraged ambulation  - PRN: simethicone, zofran, phenergan, benadryl  - Heme: Preop CBC 4.4/15.2/47.7/180; AM CBC 7.5/11/33.3/149, CMP normal  - PPX: ambulation encouraged, IS encouraged, ppx lovenox not indicated, TEDs/SCDs in place    Disposition: Will plan to evaluate the patient as she meets her post-operative milestones. Will tentatively plan for discharge to home tomorrow, POD #2.         Depression    - no current meds        Acid reflux    - daily protonix        Hepatitis C- Type 2 B    - no known cirrhosis        HIV (human immunodeficiency virus infection)    History of Intra venous drug abuse about 20 years ago   Stopped IVDU 20 years ago  Smoked crack cocaine until 4 years ago  Diagnosed about 20 years ago   Follows with infectious disease at [place] , Last seen about 3 months ago   Not on HIV treatment, never was on treatment   To her understanding she did not need HIV treatment   No history of Tuberculosis, Thrush or Proneness to Infection   Unaware of CD4 count         Tobacco abuse    - nicotine patch PRN        Essential hypertension    - BP: ()/(60-86) 105/72    - holding lisinopril 20 mg perioperatively; restart on discharge        Pelvic mass    - appeared benign intra-opereatively  - possible degenerating fibroids          VTE Risk Mitigation         Ordered     IP VTE LOW RISK PATIENT  Once      05/04/18 0923     Place sequential compression device  Until discontinued      05/04/18 0923     Place NAVEED hose  Until discontinued      05/04/18 0923          Was ibarra catheter removed? Yes    Shabnam Jasmine MD  Gynecologic Oncology  [facility]

## 2018-05-04 NOTE — PLAN OF CARE
Planning for potential d/c this weekend if all post op milestones are met.  No d/c needs anticipated.  Will continue to follow.       05/04/18 1014   Final Note   Assessment Type Final Discharge Note   Discharge Disposition Home   What phone number can be called within the next 1-3 days to see how you are doing after discharge? ([phone])   Hospital Follow Up  Appt(s) scheduled? (message sent to gyn onc clinic)

## 2018-05-04 NOTE — PLAN OF CARE
Problem: Patient Care Overview  Goal: Plan of Care Review  Outcome: Ongoing (interventions implemented as appropriate)  Pt AAOx4, afebrile, free of falls. Pt c/o incisional pain managed well with PRN PO medications and scheduled IV. Pt denies N/V. Maintenance IVF remain in place. Dressing stable with dry/marked drainage, no new changes. Sahu care provided. Catheter to be removed this morning. Pt assisting in self repositioning overnight. WCTM.

## 2018-05-04 NOTE — ASSESSMENT & PLAN NOTE
- Procedure complications: NA  - EBL: 250 cc  - IVF @ 125 cc/h; d/c when tolerating PO  - Diet: CLD, ADAT to regular  - Pain control: percocet, ibuprofen 800 mg, 0.5 mg dilaudid BTP  - In/Out: Sahu in place draining clear yellow urine; d/c Sahu in AM POD #1   - UOP 1050 cc/12h = 1.53  - Bowel function: -flatus/-BM, as expected POD #0  - PRN: simethicone, zofran, phenergan, benadryl  - Heme: Preop CBC 4.4/15.2/47.7/180; AM CBC, CMP pending  - PPX: ambulation encouraged, IS encouraged, ppx lovenox not indicated, TEDs/SCDs in place    Disposition: Will plan to evaluate the patient as she meets her post-operative milestones. Will tentatively plan for discharge to home POD #2.

## 2018-05-04 NOTE — ASSESSMENT & PLAN NOTE
- Procedure complications: NA  - EBL: 250 cc  - Diet: regular  - Pain control: percocet, ibuprofen 800 mg, 0.5 mg dilaudid BTP  - In/Out: voiding well s/p Sahu   - Bowel function: -flatus/-BM, encouraged ambulation  - PRN: simethicone, zofran, phenergan, benadryl  - Heme: Preop CBC 4.4/15.2/47.7/180; AM CBC 7.5/11/33.3/149, CMP normal  - PPX: ambulation encouraged, IS encouraged, ppx lovenox not indicated, TEDs/SCDs in place    Disposition: Will plan to evaluate the patient as she meets her post-operative milestones. Will tentatively plan for discharge to home tomorrow, POD #2.

## 2018-05-04 NOTE — ASSESSMENT & PLAN NOTE
- Procedure complications: NA  - EBL: 250 cc  - Diet: regular  - Pain control: percocet, ibuprofen 800 mg, 0.5 mg dilaudid BTP  - In/Out: voiding well s/p Sahu   - Bowel function: +flatus/-BM, encouraged ambulation  - PRN: simethicone, zofran, phenergan, benadryl  - Heme: Preop CBC 4.4/15.2/47.7/180; AM CBC 7.5/11/33.3/149, CMP normal  - PPX: ambulation encouraged, IS encouraged, ppx lovenox not indicated, TEDs/SCDs in place    Disposition: Patient has met her post-operative milestones. Will plan for discharge to home today, POD #2.

## 2018-05-04 NOTE — SUBJECTIVE & OBJECTIVE
Interval History:   POD #1 s/p X-lap/ROMAIN/BSO. Doing well. Pain well controlled overnight. Tolerated some small snacks. Sahu in place draining clear urine with adequate output. Has not yet ambulated. -flatus/-BM.     Scheduled Meds:   acetaminophen  1,000 mg Intravenous Q8H    ibuprofen  400 mg Intravenous Q6H    mupirocin  1 g Nasal BID    nicotine  1 patch Transdermal Daily    pantoprazole 40 mg in dextrose 5 % 100 mL infusion (ready to mix system)  40 mg Intravenous Daily    senna-docusate 8.6-50 mg  1 tablet Oral BID     Continuous Infusions:   lactated ringers 100 mL/hr at 05/03/18 2301     PRN Meds:albuterol-ipratropium 2.5mg-0.5mg/3mL, hydrALAZINE, HYDROmorphone, metoclopramide HCl, ondansetron, oxyCODONE, oxyCODONE, traZODone    Review of patient's allergies indicates:  No Known Allergies    Objective:     Vital Signs (Most Recent):  Temp: 98.4 °F (36.9 °C) (05/04/18 0433)  Pulse: 65 (05/04/18 0433)  Resp: 16 (05/04/18 0433)  BP: 106/65 (05/04/18 0433)  SpO2: (!) 94 % (05/04/18 0433) Vital Signs (24h Range):  Temp:  [97.3 °F (36.3 °C)-98.6 °F (37 °C)] 98.4 °F (36.9 °C)  Pulse:  [55-84] 65  Resp:  [10-18] 16  SpO2:  [90 %-97 %] 94 %  BP: ()/(60-86) 106/65     Weight: 57.2 kg (126 lb)  Body mass index is 25.45 kg/m².    Intake/Output - Last 3 Shifts       05/02 0700 - 05/03 0659 05/03 0700 - 05/04 0659    I.V. (mL/kg)  2425 (42.4)    IV Piggyback  500    Total Intake(mL/kg)  2925 (51.1)    Urine (mL/kg/hr)  825 (0.6)    Blood  250 (0.2)    Total Output   1075    Net   +1850                   Physical Exam:   Constitutional: She is oriented to person, place, and time. She appears well-developed and well-nourished.    HENT:   Head: Normocephalic and atraumatic.    Eyes: Conjunctivae and EOM are normal. Pupils are equal, round, and reactive to light.    Neck: Normal range of motion. Neck supple.    Cardiovascular: Normal rate, regular rhythm and normal heart sounds.     Pulmonary/Chest: Effort normal  and breath sounds normal. No respiratory distress.        Abdominal: Soft. Bowel sounds are normal. She exhibits abdominal incision. She exhibits no distension. There is tenderness (mild, appropriate). There is no rebound and no guarding.   Midline incision is bandaged with steri strips in place. No excessive drainage. Soft, non-distended. Bowel sounds hypoactive but present throughout.              Musculoskeletal: Normal range of motion and moves all extremeties.       Neurological: She is alert and oriented to person, place, and time.    Skin: Skin is warm and dry. No rash noted.    Psychiatric: She has a normal mood and affect. Her behavior is normal. Judgment and thought content normal.     Lines/Drains/Airways     Drain                 Urethral Catheter 05/03/18 0728 Straight-tip;Non-latex 16 Fr. less than 1 day          Peripheral Intravenous Line                 Peripheral IV - Single Lumen Left Hand -- days         Peripheral IV - Single Lumen 05/03/18 0552 Right Hand less than 1 day              Laboratory:  Pre CBC: 4.4/15.2/47.7/180  Awaiting AM CBC, BMP    Diagnostic Results:  Pathology pending

## 2018-05-04 NOTE — SUBJECTIVE & OBJECTIVE
Interval History:   POD #1 s/p X-lap/ROMAIN/BSO. Doing well. Pain well controlled overnight. Tolerating PO. Voiding well s/p Sahu. Ambulating in the room. -flatus/-BM.     Scheduled Meds:   ibuprofen  800 mg Oral Q6H    mupirocin  1 g Nasal BID    nicotine  1 patch Transdermal Daily    pantoprazole 40 mg in dextrose 5 % 100 mL infusion (ready to mix system)  40 mg Intravenous Daily    senna-docusate 8.6-50 mg  1 tablet Oral BID     Continuous Infusions:   lactated ringers 100 mL/hr at 05/04/18 1137     PRN Meds:albuterol-ipratropium 2.5mg-0.5mg/3mL, hydrALAZINE, HYDROmorphone, metoclopramide HCl, ondansetron, oxyCODONE, oxyCODONE, traZODone    Review of patient's allergies indicates:  No Known Allergies    Objective:     Vital Signs (Most Recent):  Temp: 98.2 °F (36.8 °C) (05/04/18 1220)  Pulse: 89 (05/04/18 1220)  Resp: 18 (05/04/18 1220)  BP: 105/72 (05/04/18 1220)  SpO2: 96 % (05/04/18 1220) Vital Signs (24h Range):  Temp:  [97.6 °F (36.4 °C)-98.6 °F (37 °C)] 98.2 °F (36.8 °C)  Pulse:  [64-89] 89  Resp:  [11-18] 18  SpO2:  [90 %-96 %] 96 %  BP: ()/(60-86) 105/72     Weight: 57.2 kg (126 lb)  Body mass index is 25.45 kg/m².    Intake/Output - Last 3 Shifts       05/02 0700 - 05/03 0659 05/03 0700 - 05/04 0659 05/04 0700 - 05/05 0659    I.V. (mL/kg)  3933.3 (68.8)     IV Piggyback  600     Total Intake(mL/kg)  4533.3 (79.3)     Urine (mL/kg/hr)  1975 (1.4)     Blood  250 (0.2)     Total Output   2225      Net   +2308.3             Urine Occurrence   1 x           Physical Exam:   Constitutional: She is oriented to person, place, and time. She appears well-developed and well-nourished.    HENT:   Head: Normocephalic and atraumatic.    Eyes: Conjunctivae and EOM are normal. Pupils are equal, round, and reactive to light.    Neck: Normal range of motion. Neck supple.    Cardiovascular: Normal rate, regular rhythm and normal heart sounds.     Pulmonary/Chest: Effort normal and breath sounds normal. No  respiratory distress.        Abdominal: Soft. Bowel sounds are normal. She exhibits abdominal incision. She exhibits no distension. There is tenderness (mild, appropriate). There is no rebound and no guarding.   Midline incision is bandaged with steri strips in place. No excessive drainage. Soft, non-distended. Bowel sounds hypoactive but present throughout.              Musculoskeletal: Normal range of motion and moves all extremeties.       Neurological: She is alert and oriented to person, place, and time.    Skin: Skin is warm and dry. No rash noted.    Psychiatric: She has a normal mood and affect. Her behavior is normal. Judgment and thought content normal.     Lines/Drains/Airways     Peripheral Intravenous Line                 Peripheral IV - Single Lumen Left Hand -- days         Peripheral IV - Single Lumen 05/03/18 0552 Right Hand 1 day              Laboratory:    Recent Labs  Lab 05/03/18  1026 05/04/18  0514   WBC 5.84 7.49   HGB 13.3 11.0*   HCT 41.3 33.3*   MCV 96 93    149*        Recent Labs  Lab 05/04/18  0436   *   K 3.9      CO2 21*   BUN 8   CREATININE 0.6   *     Diagnostic Results:  Pathology pending

## 2018-05-04 NOTE — ASSESSMENT & PLAN NOTE
History of Intra venous drug abuse about 20 years ago   Stopped IVDU 20 years ago  Smoked crack cocaine until 4 years ago  Diagnosed about 20 years ago   Follows with infectious disease at [place] , Last seen about 3 months ago   Not on HIV treatment, never was on treatment   To her understanding she did not need HIV treatment   No history of Tuberculosis, Thrush or Proneness to Infection   Unaware of CD4 count

## 2018-05-04 NOTE — NURSING
Pt awake and alert, resting in bed, no distress, voices moderate pain to abdominal surgical incision, abdominal dressing intact, area of dried blood noted, no new bleeding noted, bed in lowest position and locked, side rails up times two, call bell and personal belongings within reach

## 2018-05-05 VITALS
HEIGHT: 59 IN | TEMPERATURE: 98 F | OXYGEN SATURATION: 97 % | HEART RATE: 70 BPM | SYSTOLIC BLOOD PRESSURE: 146 MMHG | BODY MASS INDEX: 25.4 KG/M2 | RESPIRATION RATE: 19 BRPM | DIASTOLIC BLOOD PRESSURE: 90 MMHG | WEIGHT: 126 LBS

## 2018-05-05 PROCEDURE — 99024 POSTOP FOLLOW-UP VISIT: CPT | Mod: ,,, | Performed by: OBSTETRICS & GYNECOLOGY

## 2018-05-05 PROCEDURE — 25000003 PHARM REV CODE 250: Performed by: STUDENT IN AN ORGANIZED HEALTH CARE EDUCATION/TRAINING PROGRAM

## 2018-05-05 RX ORDER — OXYCODONE AND ACETAMINOPHEN 10; 325 MG/1; MG/1
1 TABLET ORAL EVERY 4 HOURS PRN
Status: DISCONTINUED | OUTPATIENT
Start: 2018-05-05 | End: 2018-05-05 | Stop reason: HOSPADM

## 2018-05-05 RX ORDER — OXYCODONE AND ACETAMINOPHEN 5; 325 MG/1; MG/1
1 TABLET ORAL EVERY 4 HOURS PRN
Status: DISCONTINUED | OUTPATIENT
Start: 2018-05-05 | End: 2018-05-05 | Stop reason: HOSPADM

## 2018-05-05 RX ADMIN — IBUPROFEN 800 MG: 400 TABLET, FILM COATED ORAL at 05:05

## 2018-05-05 RX ADMIN — OXYCODONE HYDROCHLORIDE AND ACETAMINOPHEN 1 TABLET: 5; 325 TABLET ORAL at 08:05

## 2018-05-05 NOTE — NURSING
Discharge instructions given to and reviewed with patient. Patient verbalized understanding and taught back medication orders and discharge information. Patients IVs removed free of complications. Patient given written prescriptions. Patient escorted off floor in  free of SS of distress.

## 2018-05-05 NOTE — DISCHARGE SUMMARY
[facility]  Gynecologic Oncology  Discharge Summary    Patient Name: Michelle Rivers  MRN: [id number]  Admission Date: 5/3/2018  Hospital Length of Stay: 2 days  Discharge Date and Time:  05/05/2018 7:41 AM  Attending Physician: Kaitlin Pinto MD   Discharging Provider: Shabnam Jasmine MD  Primary Care Provider: Serenity Aaron MD    HPI:   Michelle Rivers is a 53 y.o. here for scheduled ROMAIN.     Per last clinic visit:  HPI  Referred from Dr. Norma Saunders in [place] for pelvic mass. P1. Primary complaint is diarrhea. And weight loss.       Imaging reviewed.  CT 2/27/18 14.8cm predominately cystic pelvic mass with questionable solid component likely arising from the left ovary. No adenopathy or ascites.   US 2/28/18 16.2cm complex cystic pelvic mass, 6.3cm solid mass, unable to delineate uterus and ovaries      Labs reviewed:   normal per notes.   hgb 14.7  Cr 0.75     Medical history significant for HIV/Hep C, HTN, tobacco use.      Prior abdominal surgeries include c/s x 1.      Colonoscopy scheduled for 3/12/18.      MMG per patient normal a couple months ago.      Denies family history of breast, colon, uterine, or ovarian cancer.     Presents today for follow up. Colonoscopy 3/2018 normal per patient. CEA 5.5   CT 3/19/18  Impression   Complex heterogeneous pelvic mass consistent with history of ovarian neoplasm.  Mild distention of the bilateral renal collecting systems likely due to mass effect from a pelvic mass.       Hospital Course:  05/03/2018: To OR for planned procedures: ROMAIN/BSO,fibroid uterus. Uncomplicated procedure.  cc. Doing well post-operatively. Tolerating sips. Still on IVF at 125. Pain controlled. Sahu in place draining clear yellow urine.  05/04/2018 POD #1 s/p X-lap/ROMAIN/BSO. Doing well. Pain well controlled overnight. Tolerating PO. Voiding well s/p Sahu. Ambulating in the room. -flatus/-BM.   05/04/2018 POD #1. Meeting all post-operative milestones.  Tolerating PO, pain well controlled, ambulating, voiding, passing flatus. Plan for discharge to home today. Routine follow up in clinic with Dr. Pinto.  05/05/2018 POD #2. Doing well. Overnight, patient pulse ox reading in the high 80s when sleeping. RN called requesting 2L NC. Patient O2 saturation improved. She had no complaint at the time: no SOB, no HA, no lightheadedness/dizziness. This AM in low to mid 90s on RA. Reports adequate pain control. BP starting to return to baseline: ok to restart HTN meds on discharge. Patient medically stable and ready for discharge to home today having met all her postop milestones.    Procedure(s) (LRB):  HYSTERECTOMY-ABDOMINAL-TOTAL (ROMAIN) (N/A)  ICXXOMVO-OPMZSEQMSEUP-USOYRLNVG (BSO) (Bilateral)  RESECTION-MASS (N/A)         Significant Diagnostic Studies: Labs:   CMP   Recent Labs  Lab 05/04/18  0436   *   K 3.9      CO2 21*   *   BUN 8   CREATININE 0.6   CALCIUM 8.2*   ANIONGAP 8   ESTGFRAFRICA >60.0   EGFRNONAA >60.0    and CBC   Recent Labs  Lab 05/03/18  1026 05/04/18  0514   WBC 5.84 7.49   HGB 13.3 11.0*   HCT 41.3 33.3*    149*       Pending Diagnostic Studies:     None        Final Active Diagnoses:    Diagnosis Date Noted POA    PRINCIPAL PROBLEM:  Pelvic mass [R19.00] 03/06/2018 Yes    S/P ROMAIN-BSO 5/3/2018 [Z90.710, Z90.722, Z90.79] 05/03/2018 Not Applicable    HIV (human immunodeficiency virus infection) [B20] 04/26/2018 Yes    Hepatitis C- Type 2 B [B19.20] 04/26/2018 Yes    Tobacco abuse [Z72.0] 04/26/2018 Yes    Acid reflux [K21.9] 04/26/2018 Yes    Essential hypertension [I10] 04/26/2018 Yes    Depression [F32.9] 04/26/2018 Yes      Problems Resolved During this Admission:    Diagnosis Date Noted Date Resolved POA        Does this patient meet criteria for extended DVT prophylaxis? No, because not indicated    Discharged Condition: good    Disposition: Home or Self Care    Follow Up:  Follow-up Information     Kaitlin Pinto MD  In 4 weeks.    Specialty:  Gynecologic Oncology  Why:  follow up- as scheduled by clinic  Contact information:  [address]  [facility] [address]  [phone]                 Patient Instructions:     Diet Adult Regular     Activity as tolerated     Notify your health care provider if you experience any of the following:  increased confusion or weakness     Notify your health care provider if you experience any of the following:  persistent dizziness, light-headedness, or visual disturbances     Notify your health care provider if you experience any of the following:  worsening rash     Notify your health care provider if you experience any of the following:  severe persistent headache     Notify your health care provider if you experience any of the following:  difficulty breathing or increased cough     Notify your health care provider if you experience any of the following:  redness, tenderness, or signs of infection (pain, swelling, redness, odor or green/yellow discharge around incision site)     Notify your health care provider if you experience any of the following:  severe uncontrolled pain     Notify your health care provider if you experience any of the following:  persistent nausea and vomiting or diarrhea     Notify your health care provider if you experience any of the following:  temperature >100.4       Medications:  Reconciled Home Medications:      Medication List      START taking these medications    ibuprofen 800 MG tablet  Commonly known as:  ADVIL,MOTRIN  Take 1 tablet (800 mg total) by mouth every 6 (six) hours.     oxyCODONE-acetaminophen 5-325 mg per tablet  Commonly known as:  PERCOCET  Take 1 tablet by mouth every 4 (four) hours as needed for Pain.        CONTINUE taking these medications    albuterol 90 mcg/actuation inhaler  Inhale 2 puffs into the lungs every 4 (four) hours as needed for Wheezing.     lisinopril 20 MG tablet  Commonly known as:  PRINIVIL,ZESTRIL  Take 20 mg by  mouth every morning.     traZODone 150 MG tablet  Commonly known as:  DESYREL  Take 150 mg by mouth nightly as needed for Insomnia.          Shabnam Jasmine MD  Gynecologic Oncology  [facility]

## 2018-05-05 NOTE — NURSING
Pt sats on RA low, 83. Put pt on 2L o2, NC. MD Jasmine instructed to put orders in for o2, and to try not to give IV dilaudid. Instructed to admin early dose 5mg of oxy. Pt lung sounds clear. RR WNL. WCTM

## 2018-05-05 NOTE — PLAN OF CARE
Problem: Patient Care Overview  Goal: Plan of Care Review  Outcome: Ongoing (interventions implemented as appropriate)   05/05/18 0215   Coping/Psychosocial   Plan Of Care Reviewed With patient     Pt aaox4, VSS, o2 decreased on RA. Highest spo2 on RA was 89. Put on 2L o2, sats are 92. Encouraged pt to perform coughing and deep breathing exercises, along with incentive spirometer. Pt had complaints of pain and she stated that it was not being relieved with current pain medications at the time. No other complaints. With to RR with stand by assist. WCTM

## 2018-05-05 NOTE — ASSESSMENT & PLAN NOTE
- BP: (105-151)/(72-89) 132/89   - holding lisinopril 20 mg perioperatively; ok restart on discharge today

## 2018-05-05 NOTE — PROGRESS NOTES
[facility]  Gynecologic Oncology  Progress Note      Patient Name: Michelle Rivers  MRN: [id number]  Admission Date: 5/3/2018  Hospital Length of Stay: 2 days  Attending Provider: Kaitlin Pinto MD  Primary Care Provider: Serenity Aaron MD  Principal Problem: <principal problem not specified>    Follow-up For: Procedure(s) (LRB):  HYSTERECTOMY-ABDOMINAL-TOTAL (ROMAIN) (N/A)  HYXHKHBE-KCLGRIDBHULR-KLABMYLLA (BSO) (Bilateral)  RESECTION-MASS (N/A)  Post-Operative Day: 2 Days Post-Op  Subjective:      History of Present Illness:  Michelle Rivers is a 53 y.o. here for scheduled ROMAIN.     Per last clinic visit:  HPI  Referred from Dr. Norma Saunders in [place] for pelvic mass. P1. Primary complaint is diarrhea. And weight loss.       Imaging reviewed.  CT 2/27/18 14.8cm predominately cystic pelvic mass with questionable solid component likely arising from the left ovary. No adenopathy or ascites.   US 2/28/18 16.2cm complex cystic pelvic mass, 6.3cm solid mass, unable to delineate uterus and ovaries      Labs reviewed:   normal per notes.   hgb 14.7  Cr 0.75     Medical history significant for HIV/Hep C, HTN, tobacco use.      Prior abdominal surgeries include c/s x 1.      Colonoscopy scheduled for 3/12/18.      MMG per patient normal a couple months ago.      Denies family history of breast, colon, uterine, or ovarian cancer.     Presents today for follow up. Colonoscopy 3/2018 normal per patient. CEA 5.5   CT 3/19/18  Impression   Complex heterogeneous pelvic mass consistent with history of ovarian neoplasm.  Mild distention of the bilateral renal collecting systems likely due to mass effect from a pelvic mass.       Hospital Course:  05/03/2018: To OR for planned procedures: ROMAIN/BSO,fibroid uterus. Uncomplicated procedure.  cc. Doing well post-operatively. Tolerating sips. Still on IVF at 125. Pain controlled. Sahu in place draining clear yellow urine.  05/04/2018 POD #1 s/p  X-lap/ROMAIN/BSO. Doing well. Pain well controlled overnight. Tolerating PO. Voiding well s/p Sahu. Ambulating in the room. -flatus/-BM.   05/04/2018 POD #1. Meeting all post-operative milestones. Tolerating PO, pain well controlled, ambulating, voiding, passing flatus. Plan for discharge to home today. Routine follow up in clinic with Dr. Pinto.  05/05/2018 POD #2. Doing well. Overnight, patient pulse ox reading in the high 80s when sleeping. RN called requesting 2L NC. Patient O2 saturation improved. She had no complaint at the time: no SOB, no HA, no lightheadedness/dizziness. This AM in low to mid 90s on RA. Reports adequate pain control. BP starting to return to baseline: ok to restart HTN meds on discharge. Patient medically stable and ready for discharge to home today having met all her postop milestones.    Interval History:   POD #2. Doing well. Overnight, patient pulse ox reading in the high 80s when sleeping. RN called requesting 2L NC. Patient O2 saturation improved. She had no complaint at the time: no SOB, no HA, no lightheadedness/dizziness. This AM in low to mid 90s on RA. Reports adequate pain control. BP starting to return to baseline: ok to restart HTN meds on discharge. Patient medically stable and ready for discharge to home today having met all her postop milestones. Tolerating PO, pain well controlled, ambulating, voiding, passing flatus. Routine follow up in clinic with Dr. Pinto.    Scheduled Meds:   ibuprofen  800 mg Oral Q6H    mupirocin  1 g Nasal BID    nicotine  1 patch Transdermal Daily    pantoprazole 40 mg in dextrose 5 % 100 mL infusion (ready to mix system)  40 mg Intravenous Daily    senna-docusate 8.6-50 mg  1 tablet Oral BID     Continuous Infusions:   lactated ringers 100 mL/hr at 05/04/18 1137     PRN Meds:albuterol-ipratropium 2.5mg-0.5mg/3mL, hydrALAZINE, HYDROmorphone, metoclopramide HCl, ondansetron, oxyCODONE, oxyCODONE, traZODone    Review of patient's allergies  indicates:  No Known Allergies    Objective:     Vital Signs (Most Recent):  Temp: 98.2 °F (36.8 °C) (05/04/18 1220)  Pulse: 89 (05/04/18 1220)  Resp: 18 (05/04/18 1220)  BP: 105/72 (05/04/18 1220)  SpO2: 96 % (05/04/18 1220) Vital Signs (24h Range):  Temp:  [97.6 °F (36.4 °C)-98.6 °F (37 °C)] 98.2 °F (36.8 °C)  Pulse:  [64-89] 89  Resp:  [13-18] 18  SpO2:  [90 %-96 %] 96 %  BP: ()/(60-86) 105/72     Weight: 57.2 kg (126 lb)  Body mass index is 25.45 kg/m².    Intake/Output - Last 3 Shifts       05/02 0700 - 05/03 0659 05/03 0700 - 05/04 0659 05/04 0700 - 05/05 0659    I.V. (mL/kg)  3933.3 (68.8)     IV Piggyback  600     Total Intake(mL/kg)  4533.3 (79.3)     Urine (mL/kg/hr)  1975 (1.4)     Blood  250 (0.2)     Total Output   2225      Net   +2308.3             Urine Occurrence   1 x             Physical Exam:   Constitutional: She is oriented to person, place, and time. She appears well-developed and well-nourished.    HENT:   Head: Normocephalic and atraumatic.    Eyes: Conjunctivae and EOM are normal. Pupils are equal, round, and reactive to light.    Neck: Normal range of motion. Neck supple.    Cardiovascular: Normal rate, regular rhythm and normal heart sounds.     Pulmonary/Chest: Effort normal and breath sounds normal. No respiratory distress.        Abdominal: Soft. Bowel sounds are normal. She exhibits abdominal incision. She exhibits no distension. There is tenderness (mild, appropriate). There is no rebound and no guarding.   Midline incision is c/d/i with steri strips in place. No excessive drainage. Soft, non-distended. Bowel sounds normal             Musculoskeletal: Normal range of motion and moves all extremeties.       Neurological: She is alert and oriented to person, place, and time.    Skin: Skin is warm and dry. No rash noted.    Psychiatric: She has a normal mood and affect. Her behavior is normal. Judgment and thought content normal.       Lines/Drains/Airways     Peripheral  Intravenous Line                 Peripheral IV - Single Lumen Left Hand -- days         Peripheral IV - Single Lumen 05/03/18 0552 Right Hand 1 day              Laboratory:    Recent Labs  Lab 05/03/18  1026 05/04/18  0514   WBC 5.84 7.49   HGB 13.3 11.0*   HCT 41.3 33.3*   MCV 96 93    149*        Recent Labs  Lab 05/04/18  0436   *   K 3.9      CO2 21*   BUN 8   CREATININE 0.6   *     Diagnostic Results:  Pathology pending    Assessment/Plan:     S/P ROMAIN-BSO 5/3/2018    - Procedure complications: NA  - EBL: 250 cc  - Diet: regular  - Pain control: percocet, ibuprofen 800 mg, 0.5 mg dilaudid BTP  - In/Out: voiding well s/p Sahu   - Bowel function: +flatus/-BM, encouraged ambulation  - PRN: simethicone, zofran, phenergan, benadryl  - Heme: Preop CBC 4.4/15.2/47.7/180; AM CBC 7.5/11/33.3/149, CMP normal  - PPX: ambulation encouraged, IS encouraged, ppx lovenox not indicated, TEDs/SCDs in place    Disposition: Patient has met her post-operative milestones. Will plan for discharge to home today, POD #2.        Depression    - no current meds        Acid reflux    - daily protonix        Hepatitis C- Type 2 B    - no known cirrhosis        HIV (human immunodeficiency virus infection)    History of Intra venous drug abuse about 20 years ago   Stopped IVDU 20 years ago  Smoked crack cocaine until 4 years ago  Diagnosed about 20 years ago   Follows with infectious disease at [facility] , Last seen about 3 months ago   Not on HIV treatment, never was on treatment   To her understanding she did not need HIV treatment   No history of Tuberculosis, Thrush or Proneness to Infection   Unaware of CD4 count         Tobacco abuse    - nicotine patch PRN        Essential hypertension    - BP: (105-151)/(72-89) 132/89   - holding lisinopril 20 mg perioperatively; ok restart on discharge today        Pelvic mass    - appeared benign intra-opereatively  - possible degenerating fibroids          VTE Risk  Mitigation         Ordered     IP VTE LOW RISK PATIENT  Once      05/04/18 0923     Place sequential compression device  Until discontinued      05/04/18 0923     Place NAVEED hose  Until discontinued      05/04/18 0923        Was ibarra catheter removed? No: No ibarra in place    Shabnam Jasmine MD  Gynecologic Oncology  [facility]

## 2018-05-06 ENCOUNTER — NURSE TRIAGE (OUTPATIENT)
Dept: ADMINISTRATIVE | Facility: CLINIC | Age: 53
End: 2018-05-06

## 2018-05-06 ENCOUNTER — HOSPITAL ENCOUNTER (EMERGENCY)
Facility: HOSPITAL | Age: 53
Discharge: HOME | End: 2018-05-06
Attending: EMERGENCY MEDICINE
Payer: MEDICAID

## 2018-05-06 VITALS
HEART RATE: 98 BPM | HEIGHT: 59 IN | RESPIRATION RATE: 16 BRPM | SYSTOLIC BLOOD PRESSURE: 140 MMHG | OXYGEN SATURATION: 97 % | TEMPERATURE: 100 F | DIASTOLIC BLOOD PRESSURE: 89 MMHG | WEIGHT: 126 LBS | BODY MASS INDEX: 25.4 KG/M2

## 2018-05-06 DIAGNOSIS — K59.00 CONSTIPATION, UNSPECIFIED CONSTIPATION TYPE: Primary | ICD-10-CM

## 2018-05-06 DIAGNOSIS — L03.90 CELLULITIS, UNSPECIFIED CELLULITIS SITE: ICD-10-CM

## 2018-05-06 LAB
ALBUMIN SERPL BCP-MCNC: 3.3 G/DL
ALP SERPL-CCNC: 68 U/L
ALT SERPL W/O P-5'-P-CCNC: 10 U/L
ANION GAP SERPL CALC-SCNC: 12 MMOL/L
APTT BLDCRRT: 28.1 SEC
AST SERPL-CCNC: 27 U/L
BASOPHILS # BLD AUTO: 0.01 K/UL
BASOPHILS NFR BLD: 0.2 %
BILIRUB SERPL-MCNC: 0.5 MG/DL
BILIRUB UR QL STRIP: NEGATIVE
BUN SERPL-MCNC: 6 MG/DL
CALCIUM SERPL-MCNC: 9.6 MG/DL
CHLORIDE SERPL-SCNC: 103 MMOL/L
CLARITY UR: CLEAR
CO2 SERPL-SCNC: 22 MMOL/L
COLOR UR: YELLOW
CREAT SERPL-MCNC: 0.8 MG/DL
DIFFERENTIAL METHOD: ABNORMAL
EOSINOPHIL # BLD AUTO: 0 K/UL
EOSINOPHIL NFR BLD: 0.5 %
ERYTHROCYTE [DISTWIDTH] IN BLOOD BY AUTOMATED COUNT: 14.8 %
EST. GFR  (AFRICAN AMERICAN): >60 ML/MIN/1.73 M^2
EST. GFR  (NON AFRICAN AMERICAN): >60 ML/MIN/1.73 M^2
GLUCOSE SERPL-MCNC: 102 MG/DL
GLUCOSE UR QL STRIP: NEGATIVE
HCT VFR BLD AUTO: 39 %
HGB BLD-MCNC: 13.3 G/DL
HGB UR QL STRIP: NEGATIVE
INR PPP: 1
KETONES UR QL STRIP: NEGATIVE
LACTATE SERPL-SCNC: 2.1 MMOL/L
LEUKOCYTE ESTERASE UR QL STRIP: NEGATIVE
LYMPHOCYTES # BLD AUTO: 1.4 K/UL
LYMPHOCYTES NFR BLD: 24.4 %
MCH RBC QN AUTO: 31.4 PG
MCHC RBC AUTO-ENTMCNC: 34.1 G/DL
MCV RBC AUTO: 92 FL
MONOCYTES # BLD AUTO: 0.7 K/UL
MONOCYTES NFR BLD: 11.2 %
NEUTROPHILS # BLD AUTO: 3.7 K/UL
NEUTROPHILS NFR BLD: 63.7 %
NITRITE UR QL STRIP: NEGATIVE
PH UR STRIP: 7 [PH] (ref 5–8)
PLATELET # BLD AUTO: 149 K/UL
PMV BLD AUTO: 10.4 FL
POTASSIUM SERPL-SCNC: 4 MMOL/L
PROCALCITONIN SERPL IA-MCNC: 0.05 NG/ML
PROT SERPL-MCNC: 7.8 G/DL
PROT UR QL STRIP: NEGATIVE
PROTHROMBIN TIME: 10 SEC
RBC # BLD AUTO: 4.24 M/UL
SODIUM SERPL-SCNC: 137 MMOL/L
SP GR UR STRIP: <=1.005 (ref 1–1.03)
URN SPEC COLLECT METH UR: ABNORMAL
UROBILINOGEN UR STRIP-ACNC: NEGATIVE EU/DL
WBC # BLD AUTO: 5.79 K/UL

## 2018-05-06 PROCEDURE — 25000003 PHARM REV CODE 250: Performed by: EMERGENCY MEDICINE

## 2018-05-06 PROCEDURE — 85730 THROMBOPLASTIN TIME PARTIAL: CPT

## 2018-05-06 PROCEDURE — 96374 THER/PROPH/DIAG INJ IV PUSH: CPT

## 2018-05-06 PROCEDURE — 80053 COMPREHEN METABOLIC PANEL: CPT

## 2018-05-06 PROCEDURE — 96375 TX/PRO/DX INJ NEW DRUG ADDON: CPT

## 2018-05-06 PROCEDURE — 85025 COMPLETE CBC W/AUTO DIFF WBC: CPT

## 2018-05-06 PROCEDURE — 83605 ASSAY OF LACTIC ACID: CPT

## 2018-05-06 PROCEDURE — 63600175 PHARM REV CODE 636 W HCPCS: Performed by: EMERGENCY MEDICINE

## 2018-05-06 PROCEDURE — 84145 PROCALCITONIN (PCT): CPT

## 2018-05-06 PROCEDURE — 85610 PROTHROMBIN TIME: CPT

## 2018-05-06 PROCEDURE — 81003 URINALYSIS AUTO W/O SCOPE: CPT

## 2018-05-06 PROCEDURE — 25500020 PHARM REV CODE 255: Performed by: EMERGENCY MEDICINE

## 2018-05-06 PROCEDURE — 99285 EMERGENCY DEPT VISIT HI MDM: CPT | Mod: 25

## 2018-05-06 RX ORDER — SULFAMETHOXAZOLE AND TRIMETHOPRIM 800; 160 MG/1; MG/1
1 TABLET ORAL
Status: COMPLETED | OUTPATIENT
Start: 2018-05-06 | End: 2018-05-06

## 2018-05-06 RX ORDER — ONDANSETRON 2 MG/ML
4 INJECTION INTRAMUSCULAR; INTRAVENOUS
Status: COMPLETED | OUTPATIENT
Start: 2018-05-06 | End: 2018-05-06

## 2018-05-06 RX ORDER — POLYETHYLENE GLYCOL 3350 17 G/17G
17 POWDER, FOR SOLUTION ORAL DAILY
Qty: 1 BOTTLE | Refills: 1 | Status: SHIPPED | OUTPATIENT
Start: 2018-05-06

## 2018-05-06 RX ORDER — SULFAMETHOXAZOLE AND TRIMETHOPRIM 800; 160 MG/1; MG/1
1 TABLET ORAL 2 TIMES DAILY
Qty: 14 TABLET | Refills: 0 | Status: SHIPPED | OUTPATIENT
Start: 2018-05-06 | End: 2018-05-13

## 2018-05-06 RX ORDER — HYDROMORPHONE HYDROCHLORIDE 1 MG/ML
1 INJECTION, SOLUTION INTRAMUSCULAR; INTRAVENOUS; SUBCUTANEOUS ONCE
Status: COMPLETED | OUTPATIENT
Start: 2018-05-06 | End: 2018-05-06

## 2018-05-06 RX ADMIN — ONDANSETRON 4 MG: 2 INJECTION INTRAMUSCULAR; INTRAVENOUS at 08:05

## 2018-05-06 RX ADMIN — SULFAMETHOXAZOLE AND TRIMETHOPRIM 1 TABLET: 800; 160 TABLET ORAL at 11:05

## 2018-05-06 RX ADMIN — Medication 1 MG: at 08:05

## 2018-05-06 RX ADMIN — IOHEXOL 75 ML: 350 INJECTION, SOLUTION INTRAVENOUS at 09:05

## 2018-05-06 NOTE — TELEPHONE ENCOUNTER
Reason for Disposition   [1] Constant abdominal pain AND [2] present > 2 hours    Protocols used:  CONSTIPATION-A-    Patient states she has been taking laxatives and stool softeners and eating well but she cannot have a bowel movement. She had a hysterectomy on 5/3/2018. Patient advised to go to the ED because she states she is having severe abdominal pain and she is not passing much gas. She verbalized understanding.

## 2018-05-07 ENCOUNTER — TELEPHONE (OUTPATIENT)
Dept: GYNECOLOGIC ONCOLOGY | Facility: CLINIC | Age: 53
End: 2018-05-07

## 2018-05-07 LAB
BLD PROD TYP BPU: NORMAL
BLD PROD TYP BPU: NORMAL
BLOOD UNIT EXPIRATION DATE: NORMAL
BLOOD UNIT EXPIRATION DATE: NORMAL
BLOOD UNIT TYPE CODE: 7300
BLOOD UNIT TYPE CODE: 7300
BLOOD UNIT TYPE: NORMAL
BLOOD UNIT TYPE: NORMAL
CODING SYSTEM: NORMAL
CODING SYSTEM: NORMAL
DISPENSE STATUS: NORMAL
DISPENSE STATUS: NORMAL
TRANS ERYTHROCYTES VOL PATIENT: NORMAL ML
TRANS ERYTHROCYTES VOL PATIENT: NORMAL ML

## 2018-05-07 NOTE — TELEPHONE ENCOUNTER
----- Message from Grisel Estes sent at 5/7/2018 10:14 AM CDT -----  Contact: Sheryl             Name of Who is Calling: Sheryl      What is the request in detail: Sheryl called stated that the patient need a Hospital follow up appointment for this week. Please call her      Can the clinic reply by [facility]: No       What Number to Call Back if not in [facility]: [phone]

## 2018-05-07 NOTE — ED NOTES
Pt states had total hysterectomy last week due to pelvic mass.  Pt states has not had BM since surgery 7 days ago. Pt states has been passing gas but no BM.

## 2018-05-07 NOTE — ED PROVIDER NOTES
SCRIBE #1 NOTE: I, Rimma Jett, am scribing for, and in the presence of, Fredy Trevino Jr., MD. I have scribed the HPI, ROS, and Pex.     SCRIBE #2 NOTE: I, Son Barajas, am scribing for, and in the presence of,  Taylor Rollins Do, MD. I have scribed the remaining portions of the note not scribed by Scribe #1.     History      Chief Complaint   Patient presents with    Constipation     pt c/o constipation, last BM 18       Review of patient's allergies indicates:  No Known Allergies     HPI   HPI    2018, 7:32 PM   History obtained from the patient      History of Present Illness: Michelle Rivers is a 53 y.o. female patient with a PMHx of HIV who presents to the Emergency Department for constipation which onset gradually 5 days PTA. Pt's last BM was before surgery on 5/3. Pt had surgery to remove a pelvic mass. Symptoms are constant and moderate in severity. No mitigating or exacerbating factors reported. Pt is passing gas. Associated sxs include lower abd pain. Patient denies any fever, chills, n/v/d, hematochezia, melena, dysuria, hematuria, frequency, and all other sxs at this time. Pt was discharged from the hospital yesterday. No further complaints or concerns at this time.     Arrival mode: Personal vehicle      PCP: Serenity Aaron MD       Past Medical History:  Past Medical History:   Diagnosis Date    GERD (gastroesophageal reflux disease)     Hepatitis     HIV infection     Hypertension     Overdose of illicit drug        Past Surgical History:  Past Surgical History:   Procedure Laterality Date    BREAST SURGERY      Left side -      SECTION, CLASSIC      once    FRACTURE SURGERY Left     left arm surgery    HYSTERECTOMY      TOTAL ABDOMINAL HYSTERECTOMY  2018         Family History:  History reviewed. No pertinent family history.    Social History:  Social History     Social History Main Topics    Smoking status: Current Every Day Smoker     Packs/day: 1.00      Types: Cigarettes    Smokeless tobacco: Never Used    Alcohol use Yes      Comment: beer , 2 beers every other day     Drug use: Yes      Comment: crack    Sexual activity: Not on file       ROS   Review of Systems   Constitutional: Negative for chills and fever.   HENT: Negative for sore throat.    Respiratory: Negative for shortness of breath.    Cardiovascular: Negative for chest pain.   Gastrointestinal: Positive for abdominal pain (lower) and constipation. Negative for blood in stool, diarrhea, nausea and vomiting.   Genitourinary: Negative for dysuria, frequency and hematuria.   Musculoskeletal: Negative for back pain.   Skin: Negative for rash.   Neurological: Negative for weakness.   Hematological: Does not bruise/bleed easily.   All other systems reviewed and are negative.    Physical Exam      Initial Vitals [05/06/18 1856]   BP Pulse Resp Temp SpO2   (!) 176/101 90 20 98.9 °F (37.2 °C) 96 %      MAP       126          Physical Exam  Nursing Notes and Vital Signs Reviewed.  Constitutional: Patient is in moderate distress. Well-developed and well-nourished.  Head: Atraumatic. Normocephalic.  Eyes: PERRL. EOM intact. Conjunctivae are not pale. No scleral icterus.  ENT: Mucous membranes are moist. Oropharynx is clear and symmetric.    Neck: Supple. Full ROM. No lymphadenopathy.  Cardiovascular: Regular rate. Regular rhythm. No murmurs, rubs, or gallops. Distal pulses are 2+ and symmetric.  Pulmonary/Chest: No respiratory distress. Clear to auscultation bilaterally. No wheezing or rales.  Abdominal: Soft and non-distended.  There is lower abd tenderness.  No rebound, guarding, or rigidity. Good bowel sounds.  Rectal:  No tenderness.  No masses.  No hemorrhoids.  Normal sphincter tone. No stool palpated  Musculoskeletal: Moves all extremities. No obvious deformities. No edema. No calf tenderness.  Skin: Warm and dry. Surgical scar to lower abdomen with surrounding erythema to the L side. 4 cm, not deep  "cellulitis noted; looks like more of a fine ecchymosis. This area is not more tender to touch than the rest of the abdominal wall. There is no puss or fluid drainage.  Neurological:  Alert, awake, and appropriate.  Normal speech.  No acute focal neurological deficits are appreciated.  Psychiatric: Normal affect. Good eye contact. Appropriate in content.    ED Course    Procedures  ED Vital Signs:  Vitals:    05/06/18 1856 05/06/18 2310   BP: (!) 176/101 (!) 140/89   Pulse: 90 98   Resp: 20 16   Temp: 98.9 °F (37.2 °C) 99.7 °F (37.6 °C)   TempSrc: Oral Oral   SpO2: 96% 97%   Weight: 57.2 kg (126 lb)    Height: 4' 11" (1.499 m)        Abnormal Lab Results:  Labs Reviewed   CBC W/ AUTO DIFFERENTIAL - Abnormal; Notable for the following:        Result Value    MCH 31.4 (*)     RDW 14.8 (*)     Platelets 149 (*)     All other components within normal limits   COMPREHENSIVE METABOLIC PANEL - Abnormal; Notable for the following:     CO2 22 (*)     Albumin 3.3 (*)     All other components within normal limits   URINALYSIS - Abnormal; Notable for the following:     Specific Gravity, UA <=1.005 (*)     All other components within normal limits   LACTIC ACID, PLASMA   PROTIME-INR   APTT   PROCALCITONIN        All Lab Results:  Results for orders placed or performed during the hospital encounter of 05/06/18   CBC auto differential   Result Value Ref Range    WBC 5.79 3.90 - 12.70 K/uL    RBC 4.24 4.00 - 5.40 M/uL    Hemoglobin 13.3 12.0 - 16.0 g/dL    Hematocrit 39.0 37.0 - 48.5 %    MCV 92 82 - 98 fL    MCH 31.4 (H) 27.0 - 31.0 pg    MCHC 34.1 32.0 - 36.0 g/dL    RDW 14.8 (H) 11.5 - 14.5 %    Platelets 149 (L) 150 - 350 K/uL    MPV 10.4 9.2 - 12.9 fL    Gran # (ANC) 3.7 1.8 - 7.7 K/uL    Lymph # 1.4 1.0 - 4.8 K/uL    Mono # 0.7 0.3 - 1.0 K/uL    Eos # 0.0 0.0 - 0.5 K/uL    Baso # 0.01 0.00 - 0.20 K/uL    Gran% 63.7 38.0 - 73.0 %    Lymph% 24.4 18.0 - 48.0 %    Mono% 11.2 4.0 - 15.0 %    Eosinophil% 0.5 0.0 - 8.0 %    Basophil% " 0.2 0.0 - 1.9 %    Differential Method Automated    Comprehensive metabolic panel   Result Value Ref Range    Sodium 137 136 - 145 mmol/L    Potassium 4.0 3.5 - 5.1 mmol/L    Chloride 103 95 - 110 mmol/L    CO2 22 (L) 23 - 29 mmol/L    Glucose 102 70 - 110 mg/dL    BUN, Bld 6 6 - 20 mg/dL    Creatinine 0.8 0.5 - 1.4 mg/dL    Calcium 9.6 8.7 - 10.5 mg/dL    Total Protein 7.8 6.0 - 8.4 g/dL    Albumin 3.3 (L) 3.5 - 5.2 g/dL    Total Bilirubin 0.5 0.1 - 1.0 mg/dL    Alkaline Phosphatase 68 55 - 135 U/L    AST 27 10 - 40 U/L    ALT 10 10 - 44 U/L    Anion Gap 12 8 - 16 mmol/L    eGFR if African American >60 >60 mL/min/1.73 m^2    eGFR if non African American >60 >60 mL/min/1.73 m^2   Urinalysis   Result Value Ref Range    Specimen UA Urine, Clean Catch     Color, UA Yellow Yellow, Straw, Rachel    Appearance, UA Clear Clear    pH, UA 7.0 5.0 - 8.0    Specific Gravity, UA <=1.005 (A) 1.005 - 1.030    Protein, UA Negative Negative    Glucose, UA Negative Negative    Ketones, UA Negative Negative    Bilirubin (UA) Negative Negative    Occult Blood UA Negative Negative    Nitrite, UA Negative Negative    Urobilinogen, UA Negative <2.0 EU/dL    Leukocytes, UA Negative Negative   Lactic acid, plasma   Result Value Ref Range    Lactate (Lactic Acid) 2.1 0.5 - 2.2 mmol/L   Protime-INR   Result Value Ref Range    Prothrombin Time 10.0 9.0 - 12.5 sec    INR 1.0 0.8 - 1.2   APTT   Result Value Ref Range    aPTT 28.1 21.0 - 32.0 sec   Procalcitonin   Result Value Ref Range    Procalcitonin 0.05 <0.25 ng/mL       Imaging Results:  Imaging Results          CT Abdomen Pelvis With Contrast (Final result)  Result time 05/06/18 21:54:24    Final result by Adrien Morton MD (05/06/18 21:54:24)                 Impression:      1. Small bilateral pleural effusions.  New since 04/02/2018.  2. Interval resection of a large cystic and septated central and left pelvic mass.  3. 4.6 cm pelvic fluid collection.  Finding could represent  postoperative change/postoperative seroma.  Residual cystic neoplasm is a possibility.  4. Moderate and sarcoma.  New since previous exam.      Electronically signed by: Adrien Morton MD  Date:    05/06/2018  Time:    21:54             Narrative:    EXAMINATION:  CT ABDOMEN PELVIS WITH CONTRAST    CLINICAL HISTORY:  Abdominal pain, unspecified;.  Recent surgery.  Status post pelvic mass resection.  HIV.    TECHNIQUE:  Standard axial imaging performed extending from the lung bases through the pubic symphysis, following administration of intravenous contrast.  Additional 2D  reformatted sagittal and coronal images obtained.    COMPARISON:  04/02/2018    FINDINGS:  Very small bilateral pleural effusions, with atelectasis at the lung bases.    Punctate calcified hepatic granulomas.  Otherwise the liver is normal.  Gallbladder is normal.  Common bile duct is normal..  Portal vein is patent.    The spleen is normal in size and appearance.  The pancreas is normal.  The adrenal glands are normal.  The aorta and IVC are normal.  No retroperitoneal adenopathy.    The kidneys and ureters are normal, bilaterally.    The stomach is normal.  The small intestine is normal.  The appendix is normal.  The colon is normal.  The rectum is normal.    The pelvis demonstrates normal appearing, minimally distended bladder.  Uterus has been surgically removed.  Previously large central and left-sided septated pelvic mass has also been removed.  A small fluid collection is identified within the central and right paramidline pelvis measuring 4.6 by 5.3 cm.  This finding could represent postoperative free-fluid or postoperative seroma.  A residual component of the large cystic mass is also possible.  Mild hazy stranding also identified within the pelvis compatible with expected postoperative change.    Moderate body wall edema/anasarca is identified which is new since the previous examination..    Degenerative changes of the spine noted.   No suspicious osseous lesion.                               X-Ray Abdomen Flat And Erect (Final result)  Result time 05/06/18 20:23:55    Final result by Adrien Morton MD (05/06/18 20:23:55)                 Impression:      1. Nonobstructive bowel gas pattern.  2. Large amount of stool.      Electronically signed by: Adrien Morton MD  Date:    05/06/2018  Time:    20:23             Narrative:    EXAMINATION:  XR ABDOMEN FLAT AND ERECT    CLINICAL HISTORY:  abdominal pain;    COMPARISON:  None    FINDINGS:  Supine and upright views of the abdomen demonstrate no abnormal bowel dilatation.  No significant air-fluid levels.  Large amount of stool phleboliths noted within the pelvis.  Regional bones are unremarkable.                                        The Emergency Provider reviewed the vital signs and test results, which are outlined above.    ED Discussion     9:35 PM: Dr. Trevino transfers care of pt to Dr. Renteria, pending imaging results.    10:30 PM: Dr. Renteria discussed the pt's case with Dr. Jasmine (OBGYN) who recommends that Pt keep taking Bactrim to prevent infection and take Miralax for constipation.  Dr. Jasmine states that Pt should call their clinic tomorrow, and they will try to get her an appointment this week.  Dr. Jasmine also states that they will update Dr. Pinto on Pt's status and visit to ED. Dr. Jasmine reports that after Pt's surgery, Pt had no fever and had a normal WBC count.    11:27 PM: Reassessed pt at this time. Pt declined fluids.  Pt states her condition has improved at this time. Discussed with pt all pertinent ED information and results. Discussed pt dx and plan of tx. Gave pt all f/u and return to the ED instructions. All questions and concerns were addressed at this time. Pt expresses understanding of information and instructions, and is comfortable with plan to discharge. Pt is stable for discharge.    I discussed with patient and family that evaluation in the ED does not  suggest any emergent or life threatening medical conditions requiring immediate intervention beyond what was provided in the ED, and I believe patient is safe for discharge.  Regardless, an unremarkable evaluation in the ED does not preclude the development or presence of a serious of life threatening condition. As such, patient was instructed to return immediately for any worsening or change in current symptoms.        ED Medication(s):  Medications   HYDROmorphone injection 1 mg (1 mg Intravenous Given 5/6/18 2039)   ondansetron injection 4 mg (4 mg Intravenous Given 5/6/18 2039)   omnipaque 350 iohexol 75 mL (75 mLs Intravenous Given 5/6/18 2147)   sulfamethoxazole-trimethoprim 800-160mg per tablet 1 tablet (1 tablet Oral Given 5/6/18 5067)       Discharge Medication List as of 5/6/2018 11:05 PM      START taking these medications    Details   polyethylene glycol (GLYCOLAX) 17 gram/dose powder Take 17 g by mouth once daily., Starting Sun 5/6/2018, Print      sulfamethoxazole-trimethoprim 800-160mg (BACTRIM DS) 800-160 mg Tab Take 1 tablet by mouth 2 (two) times daily., Starting Sun 5/6/2018, Until Sun 5/13/2018, Print             Follow-up Information     Kaitlin Pinto MD In 1 day.    Specialty:  Gynecologic Oncology  Contact information:  [address] [facility] [id number] [phone]                     Medical Decision Making    Medical Decision Making:   Clinical Tests:   Lab Tests: Ordered and Reviewed  Radiological Study: Ordered and Reviewed           Scribe Attestation:   Scribe #1: I performed the above scribed service and the documentation accurately describes the services I performed. I attest to the accuracy of the note.    Attending:   Physician Attestation Statement for Scribe #1: I, Fredy Trevino Jr., MD, personally performed the services described in this documentation, as scribed by Rimma Jett, in my presence, and it is both accurate and complete.       Scribe Attestation:   Shabanaibansley  #2: I performed the above scribed service and the documentation accurately describes the services I performed. I attest to the accuracy of the note.    Attending Attestation:           Physician Attestation for Scribe:    Physician Attestation Statement for Shabanaibe #2: I, Taylor Rollins Do, MD, reviewed documentation, as scribed by Son Barajas in my presence, and it is both accurate and complete. I also acknowledge and confirm the content of the note done by Mario #1.          Clinical Impression       ICD-10-CM ICD-9-CM   1. Constipation, unspecified constipation type K59.00 564.00   2. Cellulitis, unspecified cellulitis site L03.90 682.9       Disposition:   Disposition: Discharged  Condition: Stable         Taylor Rollins Do, MD  05/07/18 0205

## 2018-05-07 NOTE — PHYSICIAN QUERY
PT Name: Michelle Rivers  MR #: [id number]    Physician Query Form - HIV Clarification     CDS/: GARY Obando,RNC-MNN           Contact information:[email or web address]     This form is a permanent document in the medical record.     Query Date: May 7, 2018      By submitting this query, we are merely seeking further clarification of documentation. Please utilize your independent clinical judgment when addressing the question(s) below.    The Medical record contains the following:   Indicators   Supporting Clinical Findings Location in Medical Record   X HIV or AIDS HIV (human immunodeficiency virus infection)   Gyn Onc Progress note 5/5@857am   X CD4 Count          CD4%        Viral Load Unaware of CD4 count  Gyn Onc Progress note 5/5@857am   X History of Opportunistic Infection No history of Tuberculosis, Thrush or Proneness to Infection  Gyn Onc Progress note 5/5@857am    Cancer  Pneumocystis Pneumonia (PCP)  Cytomegalovirus (CMV)  Kaposi Sarcoma      HIV-Related Conditions (e.g. Dementia, Encephalopathy) documented     X Medications Not on HIV treatment, never was on treatment   To her understanding she did not need HIV treatment  Gyn Onc Progress note 5/5@857am    Other       Please clarify the patients HIV status  Per CDC publication Vol 60 RR-17 https://www.cdc.gov/mmwr/preview/mmwrhtml/ko1760o9.htmRelating to the classification HIV Infection, once a patient is diagnosed with AIDS the diagnosis still stands even if, after treatment, the CD4+ T cell count rises to above 200 per ìL of blood or other AIDS-defining illnesses are cured.       The following are AIDS-Defining Illnesses or HIV-Related Diseases.  Bacterial infections, multiple or recurrent only in children aged <6 years Kaposi sarcoma   Candidiasis of bronchi, trachea, or lungs Lymphoma, Burkitt (or equivalent term)   Candidiasis of esophagus Lymphoma, immunoblastic (or equivalent term)   Cervical cancer, invasive Only among  adults, adolescents, and children aged > 6 years. Lymphoma, primary, of brain   Coccidioidomycosis, disseminated or extrapulmonary Mycobacterium avium complex or Mycobacterium kansasii, disseminated or extrapulmonary   Cryptococcosis, extrapulmonary Mycobacterium tuberculosis of any site, pulmonary, disseminated, or extrapulmonary   Cryptosporidiosis, chronic intestinal (>1 months duration) Mycobacterium, other species or unidentified species, disseminated or extrapulmonary   Cytomegalovirus disease (other than liver, spleen, or nodes), onset at age >1 month Pneumocystis jirovecii (previously known as Pneumocystis carinii) pneumonia   Cytomegalovirus retinitis (with loss of vision) Pneumonia, recurrent Only among adults, adolescents, and children aged .6 years.   Encephalopathy attributed to HIV Progressive multifocal leukoencephalopathy   Herpes simplex: chronic ulcers (>1 months duration) or bronchitis, pneumonitis, or esophagitis (onset at age >1 month) Salmonella septicemia, recurrent   Histoplasmosis, disseminated or extrapulmonary Toxoplasmosis of brain, onset at age >1 month   Isosporiasis, chronic intestinal (>1 months duration) Wasting syndrome attributed to HIV     [X ] Asymptomatic HIV Infection - Positive Status Only - without any history of (or current) AIDS-Defining Illness or HIV-Related Illness    [ ] HIV Disease / AIDS - Meets the current CDC Definition of AIDS: HIV-infected persons who have less than 200 CD4+ T-lymphocytes/uL, or CD4+ T-lymphocyte percentage of total lymphocytes of less than 14, and/or an AIDS-Defining Illness or HIV-Related Disease (see above).    [ ] Patient is HIV Negative  [ ] Other (please specify): ____________________________________  [ ] Clinically Undetermined    Please document in your progress notes daily for the duration of treatment until resolved and include in your discharge summary.

## 2018-05-08 ENCOUNTER — TELEPHONE (OUTPATIENT)
Dept: GYNECOLOGIC ONCOLOGY | Facility: CLINIC | Age: 53
End: 2018-05-08

## 2018-05-08 NOTE — TELEPHONE ENCOUNTER
"Spoke with Sheryl pt aunt. She was advised pt can take a stool softener and miralax to help with bowel movement. She voiced understanding. Per Sheryl "pt had a small bowel movement today".  "

## 2018-05-08 NOTE — TELEPHONE ENCOUNTER
----- Message from Grisel Estes sent at 5/8/2018  1:00 PM CDT -----  Contact: Sheryl            Name of Who is Calling: Sheryl      What is the request in detail: Sheryl called she stated that the patient haven't had a bowel movement since she came home from the hospital and she need to speak to someone about it.      Can the clinic reply by [facility]: No      What Number to Call Back if not in [facility]: [phone]

## 2018-05-09 ENCOUNTER — OFFICE VISIT (OUTPATIENT)
Dept: GYNECOLOGIC ONCOLOGY | Facility: CLINIC | Age: 53
End: 2018-05-09
Payer: MEDICAID

## 2018-05-09 VITALS
SYSTOLIC BLOOD PRESSURE: 134 MMHG | DIASTOLIC BLOOD PRESSURE: 88 MMHG | BODY MASS INDEX: 26.31 KG/M2 | WEIGHT: 130.5 LBS | HEART RATE: 70 BPM | HEIGHT: 59 IN

## 2018-05-09 DIAGNOSIS — Z90.722 S/P TAH-BSO: ICD-10-CM

## 2018-05-09 DIAGNOSIS — Z90.79 S/P TAH-BSO: ICD-10-CM

## 2018-05-09 DIAGNOSIS — R19.00 PELVIC MASS: Primary | ICD-10-CM

## 2018-05-09 DIAGNOSIS — Z90.710 S/P TAH-BSO: ICD-10-CM

## 2018-05-09 PROCEDURE — 99212 OFFICE O/P EST SF 10 MIN: CPT | Mod: PBBFAC | Performed by: OBSTETRICS & GYNECOLOGY

## 2018-05-09 PROCEDURE — 99024 POSTOP FOLLOW-UP VISIT: CPT | Mod: ,,, | Performed by: OBSTETRICS & GYNECOLOGY

## 2018-05-09 PROCEDURE — 99999 PR PBB SHADOW E&M-EST. PATIENT-LVL II: CPT | Mod: PBBFAC,,, | Performed by: OBSTETRICS & GYNECOLOGY

## 2018-05-09 NOTE — LETTER
May 13, 2018        Norma Saunders MD  [address]  [address]  [facility]  [facility] [address]             [facility]  [id number] [place] [id number]  Phone: [phone]  Fax: [phone]   Patient: Michelle Rivers   MR Number: [id number]   YOB: 1965   Date of Visit: 5/9/2018       Dear Dr. Saunders:    Thank you for referring Michelle Rivers to me for evaluation. Attached you will find relevant portions of my assessment and plan of care.    If you have questions, please do not hesitate to call me. I look forward to following Michelle Rivers along with you.    Sincerely,      Kaitlin Pinto MD            CC  No Recipients    Enclosure

## 2018-05-13 NOTE — PROGRESS NOTES
Subjective:      Patient ID: Michelle Rivers is a 53 y.o. female.    Chief Complaint: No chief complaint on file.      HPI  She underwent ROMAIN/BSO/pelvic mass resection 5/3/18. Final pathology reviewed and benign, mucinous cystadenoma of the ovary.     Presents today for post op visit. Was seen in the ED over the weekend for constipation.     History:  Referred from Dr. Norma Saunders in [place] for pelvic mass. P1. Primary complaint is diarrhea. And weight loss.       Imaging reviewed.  CT 2/27/18 14.8cm predominately cystic pelvic mass with questionable solid component likely arising from the left ovary. No adenopathy or ascites.   US 2/28/18 16.2cm complex cystic pelvic mass, 6.3cm solid mass, unable to delineate uterus and ovaries      Labs reviewed:   normal per notes.   hgb 14.7  Cr 0.75     Medical history significant for HIV/Hep C, HTN, tobacco use.      Prior abdominal surgeries include c/s x 1.      Colonoscopy scheduled for 3/12/18.      MMG per patient normal a couple months ago.      Denies family history of breast, colon, uterine, or ovarian cancer.     Presents today for follow up. Colonoscopy 3/2018 normal per patient. CEA 5.5   CT 3/19/18  Impression   Complex heterogeneous pelvic mass consistent with history of ovarian neoplasm.  Mild distention of the bilateral renal collecting systems likely due to mass effect from a pelvic mass       Review of Systems   Constitutional: Negative for appetite change, chills, fatigue and fever.   HENT: Negative for mouth sores.    Respiratory: Negative for cough and shortness of breath.    Cardiovascular: Negative for leg swelling.   Gastrointestinal: Negative for abdominal pain, blood in stool, constipation and diarrhea.   Endocrine: Negative for cold intolerance.   Genitourinary: Negative for dysuria and vaginal bleeding.   Musculoskeletal: Negative for myalgias.   Skin: Negative for rash.   Allergic/Immunologic: Negative.    Neurological: Negative for  weakness and numbness.   Hematological: Negative for adenopathy. Does not bruise/bleed easily.   Psychiatric/Behavioral: Negative for confusion.       Objective:   Physical Exam:   Constitutional: She is oriented to person, place, and time. She appears well-developed and well-nourished.    HENT:   Head: Normocephalic and atraumatic.    Eyes: EOM are normal. Pupils are equal, round, and reactive to light.    Neck: Normal range of motion. Neck supple. No thyromegaly present.    Cardiovascular: Normal rate, regular rhythm and intact distal pulses.     Pulmonary/Chest: Effort normal and breath sounds normal. No respiratory distress. She has no wheezes.        Abdominal: Soft. Bowel sounds are normal. She exhibits abdominal incision. She exhibits no distension, no ascites and no mass. There is no tenderness.             Musculoskeletal: Normal range of motion and moves all extremeties.      Lymphadenopathy:     She has no cervical adenopathy.        Right: No supraclavicular adenopathy present.        Left: No supraclavicular adenopathy present.    Neurological: She is alert and oriented to person, place, and time.    Skin: Skin is warm and dry. No rash noted.    Psychiatric: She has a normal mood and affect.       Assessment:     1. Pelvic mass    2. S/P ROMAIN-BSO 5/3/2018        Plan:   No orders of the defined types were placed in this encounter.    Is recovering appropriately from surgery. Benign final pathology. Counseled on narcotic use and constipation. Recommend miralax. RTC 4 weeks for final post operative visit. Encouraged to call with questions or concerns.

## 2018-06-13 ENCOUNTER — OFFICE VISIT (OUTPATIENT)
Dept: GYNECOLOGIC ONCOLOGY | Facility: CLINIC | Age: 53
End: 2018-06-13
Payer: MEDICAID

## 2018-06-13 VITALS
HEART RATE: 78 BPM | WEIGHT: 131.38 LBS | SYSTOLIC BLOOD PRESSURE: 130 MMHG | BODY MASS INDEX: 26.48 KG/M2 | HEIGHT: 59 IN | DIASTOLIC BLOOD PRESSURE: 89 MMHG

## 2018-06-13 DIAGNOSIS — Z90.710 S/P TAH-BSO: Primary | ICD-10-CM

## 2018-06-13 DIAGNOSIS — Z90.722 S/P TAH-BSO: Primary | ICD-10-CM

## 2018-06-13 DIAGNOSIS — R19.00 PELVIC MASS: ICD-10-CM

## 2018-06-13 DIAGNOSIS — Z90.79 S/P TAH-BSO: Primary | ICD-10-CM

## 2018-06-13 PROCEDURE — 99024 POSTOP FOLLOW-UP VISIT: CPT | Mod: ,,, | Performed by: OBSTETRICS & GYNECOLOGY

## 2018-06-13 PROCEDURE — 99999 PR PBB SHADOW E&M-EST. PATIENT-LVL II: CPT | Mod: PBBFAC,,, | Performed by: OBSTETRICS & GYNECOLOGY

## 2018-06-13 PROCEDURE — 99212 OFFICE O/P EST SF 10 MIN: CPT | Mod: PBBFAC | Performed by: OBSTETRICS & GYNECOLOGY

## 2018-06-14 NOTE — PROGRESS NOTES
Subjective:      Patient ID: Michelle Rivers is a 53 y.o. female.    Chief Complaint: No chief complaint on file.      HPI  She underwent ROMAIN/BSO/pelvic mass resection 5/3/18. Final pathology reviewed and benign, mucinous cystadenoma of the ovary.      Presents today for final post op visit. Without complaints. Continues to recover from surgery.      History:  Referred from Dr. Norma Saunders in [place] for pelvic mass. P1. Primary complaint is diarrhea. And weight loss.       Imaging reviewed.  CT 2/27/18 14.8cm predominately cystic pelvic mass with questionable solid component likely arising from the left ovary. No adenopathy or ascites.   US 2/28/18 16.2cm complex cystic pelvic mass, 6.3cm solid mass, unable to delineate uterus and ovaries      Labs reviewed:   normal per notes.   hgb 14.7  Cr 0.75     Medical history significant for HIV/Hep C, HTN, tobacco use.      Prior abdominal surgeries include c/s x 1.      Colonoscopy scheduled for 3/12/18.      MMG per patient normal a couple months ago.      Denies family history of breast, colon, uterine, or ovarian cancer.     Presents today for follow up. Colonoscopy 3/2018 normal per patient. CEA 5.5   CT 3/19/18  Impression   Complex heterogeneous pelvic mass consistent with history of ovarian neoplasm.  Mild distention of the bilateral renal collecting systems likely due to mass effect from a pelvic mass       Review of Systems   Constitutional: Negative for appetite change, chills, fatigue and fever.   HENT: Negative for mouth sores.    Respiratory: Negative for cough and shortness of breath.    Cardiovascular: Negative for leg swelling.   Gastrointestinal: Negative for abdominal pain, blood in stool, constipation and diarrhea.   Endocrine: Negative for cold intolerance.   Genitourinary: Negative for dysuria and vaginal bleeding.   Musculoskeletal: Negative for myalgias.   Skin: Negative for rash.   Allergic/Immunologic: Negative.    Neurological:  Negative for weakness and numbness.   Hematological: Negative for adenopathy. Does not bruise/bleed easily.   Psychiatric/Behavioral: Negative for confusion.       Objective:   Physical Exam:   Constitutional: She is oriented to person, place, and time. She appears well-developed and well-nourished.    HENT:   Head: Normocephalic and atraumatic.    Eyes: EOM are normal. Pupils are equal, round, and reactive to light.    Neck: Normal range of motion. Neck supple. No thyromegaly present.    Cardiovascular: Normal rate, regular rhythm and intact distal pulses.     Pulmonary/Chest: Effort normal and breath sounds normal. No respiratory distress. She has no wheezes.        Abdominal: Soft. Bowel sounds are normal. She exhibits abdominal incision. She exhibits no distension, no ascites and no mass. There is no tenderness.       Midline incision healing well     Genitourinary: Rectum normal and vagina normal. Rectal exam shows guaiac negative stool. Guaiac negative stool. Pelvic exam was performed with patient supine. There is no lesion on the right labia. There is no lesion on the left labia. Uterus is absent. There is an absent adnexa. Right adnexum displays no mass. Left adnexum displays no mass. Vaginal cuff normal.Cervix exhibits absence.   Genitourinary Comments: Vaginal cuff healing well.            Musculoskeletal: Normal range of motion and moves all extremeties.      Lymphadenopathy:     She has no cervical adenopathy.        Right: No inguinal and no supraclavicular adenopathy present.        Left: No inguinal and no supraclavicular adenopathy present.    Neurological: She is alert and oriented to person, place, and time.    Skin: Skin is warm and dry. No rash noted.    Psychiatric: She has a normal mood and affect.       Assessment:     1. S/P ROMAIN-BSO 5/3/2018    2. Pelvic mass        Plan:   No orders of the defined types were placed in this encounter.    Continues to recover well from surgery. Benign  pathology. Okay to return to Dr. Saunders for ongoing well woman health maintenance. RTC with me if needed.

## 2018-06-20 ENCOUNTER — TELEPHONE (OUTPATIENT)
Dept: GYNECOLOGIC ONCOLOGY | Facility: CLINIC | Age: 53
End: 2018-06-20

## 2018-06-20 NOTE — TELEPHONE ENCOUNTER
----- Message from Ewa Pang sent at 6/20/2018  9:44 AM CDT -----  Contact: Candace with [facility]            Name of Who is Calling: Candace with [facility]      What is the request in detail: states she did not receive faxed paperwork. Please fax paperwork to:  [phone] or [phone] Attn: Candace      Can the clinic reply by [facility]: no      [facility]: [phone]

## 2018-06-20 NOTE — TELEPHONE ENCOUNTER
----- Message from Sylwia Ge MA sent at 6/19/2018  9:50 AM CDT -----  Contact: Dr. Aaron's office  Just an FYI. I forward this message back to Grisel informing her they would have to call Medical records regarding records. I call the office back asking for the name listed below and no one works in that office under that name. So I was unable to provide them with Medical records number. Thanks KJ  ----- Message -----  From: Grisel Estes  Sent: 6/19/2018   9:43 AM  To: [facility] Staff              Name of Who is Calling: Candace      What is the request in detail: Requesting clinical notes and operation report. Please fax to [phone] Attn: Candace      Can the clinic reply by [facility]: No    What Number to Call Back if not in [facility]: [phone]

## 2019-09-11 NOTE — TELEPHONE ENCOUNTER
----- Message from Kaitlin Pinto MD sent at 3/13/2018  5:13 PM CDT -----  Regarding: CT  Can you please reschedule this patient's CT. There was a question of authorization but it looks like it is authorized now. Can be done at [facility].     Has appointment on 3/19 with me so needs to be done prior to that visit.   
Left message for pt for pt to contact office   
no

## 2021-04-21 NOTE — TELEPHONE ENCOUNTER
Spoke with Candace francois/ Dr. Aaron office. Candace confirmed fax number [phone] where to send pt progress and operative note. Faxed last note and operative note to Dr. Aaron office per Dr. Pinto    oral

## 2022-11-27 NOTE — TELEPHONE ENCOUNTER
----- Message from Gloria Barnett sent at 3/20/2018  2:45 PM CDT -----  Contact: Sheryl Joiner  _  1st Request  _  2nd Request  _  3rd Request        Who: Sheryl Joiner     Why: She is returning a call. She states the patient needs two days notice to schedule transportation.     What Number to Call Back: [phone]    When to Expect a call back: (Before the end of the day)   -- if call after 3:00 call back will be tomorrow.     45.7

## (undated) DEVICE — SUT MCRYL PLUS 4-0 PS2 27IN

## (undated) DEVICE — CLIPPER BLADE MOD 4406 (CAREF)

## (undated) DEVICE — SUT CTD VICRYL 0 UND BR CT

## (undated) DEVICE — SUT 0 54IN COATED VICRYL U

## (undated) DEVICE — SEE MEDLINE ITEM 157181

## (undated) DEVICE — APPLICATOR CHLORAPREP ORN 26ML

## (undated) DEVICE — DRESSING ADH ISLAND 3.6 X 14

## (undated) DEVICE — SPONGE LAP 18X18 PREWASHED

## (undated) DEVICE — SYR 30CC LUER LOCK

## (undated) DEVICE — TRAY FOLEY 16FR INFECTION CONT

## (undated) DEVICE — LUBRICANT SURGILUBE 2 OZ

## (undated) DEVICE — SEE MEDLINE ITEM 157148

## (undated) DEVICE — SEE MEDLINE ITEM 156902

## (undated) DEVICE — SYR 10CC LUER LOCK

## (undated) DEVICE — LEGGINGS 48X31 INCH

## (undated) DEVICE — CLOSURE SKIN STERI STRIP 1/2X4

## (undated) DEVICE — SUT CTD VICRYL 0 VIL BR/CT

## (undated) DEVICE — DRAPE STERI INSTRUMENT 1018

## (undated) DEVICE — NDL 22GA X1 1/2 REG BEVEL

## (undated) DEVICE — ELECTRODE REM PLYHSV RETURN 9

## (undated) DEVICE — NDL 20GX1-1/2IN IB

## (undated) DEVICE — ELECTRODE EXTENDED BLADE

## (undated) DEVICE — SUT 0 18IN COATED VICRYL V

## (undated) DEVICE — LOOP VESSEL YELLOW MAXI

## (undated) DEVICE — WARMER DRAPE STERILE LF

## (undated) DEVICE — SUT 1 48IN PDS II VIO MONO

## (undated) DEVICE — DRESSING ABSRBNT ISLAND 3.6X8

## (undated) DEVICE — SEE MEDLINE ITEM 154981

## (undated) DEVICE — SEE MEDLINE ITEM 146417

## (undated) DEVICE — GOWN SURGICAL X-LARGE

## (undated) DEVICE — SEE MEDLINE ITEM 152622